# Patient Record
Sex: FEMALE | Race: BLACK OR AFRICAN AMERICAN | NOT HISPANIC OR LATINO | Employment: UNEMPLOYED | URBAN - METROPOLITAN AREA
[De-identification: names, ages, dates, MRNs, and addresses within clinical notes are randomized per-mention and may not be internally consistent; named-entity substitution may affect disease eponyms.]

---

## 2018-07-22 ENCOUNTER — HOSPITAL ENCOUNTER (EMERGENCY)
Facility: HOSPITAL | Age: 33
Discharge: HOME | End: 2018-07-22
Attending: EMERGENCY MEDICINE

## 2018-07-22 VITALS
OXYGEN SATURATION: 98 % | WEIGHT: 220 LBS | BODY MASS INDEX: 32.58 KG/M2 | HEART RATE: 69 BPM | TEMPERATURE: 98 F | DIASTOLIC BLOOD PRESSURE: 85 MMHG | SYSTOLIC BLOOD PRESSURE: 137 MMHG | RESPIRATION RATE: 16 BRPM | HEIGHT: 69 IN

## 2018-07-22 DIAGNOSIS — K04.7 DENTAL ABSCESS: Primary | ICD-10-CM

## 2018-07-22 PROCEDURE — 99282 EMERGENCY DEPT VISIT SF MDM: CPT

## 2018-07-22 RX ORDER — CLINDAMYCIN HYDROCHLORIDE 150 MG/1
300 CAPSULE ORAL EVERY 6 HOURS
Qty: 80 CAPSULE | Refills: 0 | Status: SHIPPED | OUTPATIENT
Start: 2018-07-22 | End: 2018-08-01

## 2018-07-22 RX ORDER — IBUPROFEN 600 MG/1
600 TABLET ORAL EVERY 6 HOURS PRN
Qty: 30 TABLET | Refills: 0 | Status: SHIPPED | OUTPATIENT
Start: 2018-07-22 | End: 2019-02-28

## 2018-07-22 NOTE — DISCHARGE INSTRUCTIONS
Dental Abscess   WHAT YOU NEED TO KNOW:   A dental abscess is a collection of pus in or around a tooth  A dental abscess is caused by bacteria  The bacteria usually enter the tooth when the enamel (outer part of the tooth) is damaged by tooth decay  Bacteria may also enter the tooth through a break or chip in the tooth, or a cut in the gum  Food particles that are stuck between the teeth for a long time may also lead to an abscess  DISCHARGE INSTRUCTIONS:   Return to the emergency department if:   · You have severe pain  · You have trouble breathing because of pain or swelling  Contact your healthcare provider if:   · Your symptoms get worse, even after treatment  · Your mouth is bleeding  · You cannot eat or drink because of pain or swelling  · Your abscess returns  · You have an injury that causes a crack in your tooth  · You have questions or concerns about your condition or care  Medicines: You may  need any of the following:  · Antibiotics  help treat a bacterial infection  · NSAIDs , such as ibuprofen, help decrease swelling, pain, and fever  This medicine is available with or without a doctor's order  NSAIDs can cause stomach bleeding or kidney problems in certain people  If you take blood thinner medicine, always ask your healthcare provider if NSAIDs are safe for you  Always read the medicine label and follow directions  · Acetaminophen  decreases pain and fever  It is available without a doctor's order  Ask how much to take and how often to take it  Follow directions  Read the labels of all other medicines you are using to see if they also contain acetaminophen, or ask your doctor or pharmacist  Acetaminophen can cause liver damage if not taken correctly  Do not use more than 4 grams (4,000 milligrams) total of acetaminophen in one day  · Prescription pain medicine  may be given  Ask your healthcare provider how to take this medicine safely   Some prescription pain medicines contain acetaminophen  Do not take other medicines that contain acetaminophen without talking to your healthcare provider  Too much acetaminophen may cause liver damage  Prescription pain medicine may cause constipation  Ask your healthcare provider how to prevent or treat constipation  · Take your medicine as directed  Contact your healthcare provider if you think your medicine is not helping or if you have side effects  Tell him of her if you are allergic to any medicine  Keep a list of the medicines, vitamins, and herbs you take  Include the amounts, and when and why you take them  Bring the list or the pill bottles to follow-up visits  Carry your medicine list with you in case of an emergency  Self-care:   · Rinse your mouth every 2 hours with salt water  This will help keep the area clean  · Gently brush your teeth twice a day with a soft tooth brush  This will help keep the area clean  · Eat soft foods as directed  Soft foods may cause less pain  Examples include applesauce, yogurt, and cooked pasta  Ask your healthcare provider how long to follow this instruction  · Apply a warm compress to your tooth or gum  Use a cotton ball or gauze soaked in warm water  Remove the compress in 10 minutes or when it becomes cool  Repeat 3 times a day  Prevent another abscess:   · Brush your teeth at least 2 times a day with fluoride toothpaste  · Use dental floss to clean between your teeth at least once a day  · Rinse your mouth with water or mouthwash after meals and snacks  · Chew sugarless gum after meals and snacks  · Limit foods that are sticky and high in sugar such as raisons  Also limit drinks high in sugar, such as soda  · See your dentist every 6 months for dental cleanings and oral exams  Follow up with your healthcare provider in 24 hours: Your healthcare provider will need to check your teeth and gums   Write down your questions so you remember to ask them during your visits  © 2017 [employer] [employer] Information is for End User's use only and may not be sold, redistributed or otherwise used for commercial purposes  All illustrations and images included in CareNotes® are the copyrighted property of A D A M , Inc  or Arnold Contreras  The above information is an  only  It is not intended as medical advice for individual conditions or treatments  Talk to your doctor, nurse or pharmacist before following any medical regimen to see if it is safe and effective for you

## 2018-08-28 ENCOUNTER — HOSPITAL ENCOUNTER (INPATIENT)
Facility: HOSPITAL | Age: 33
LOS: 2 days | Discharge: HOME | DRG: 218 | End: 2018-08-30
Attending: SURGERY | Admitting: SURGERY
Payer: OTHER MISCELLANEOUS

## 2018-08-28 ENCOUNTER — APPOINTMENT (INPATIENT)
Dept: RADIOLOGY | Facility: HOSPITAL | Age: 33
DRG: 218 | End: 2018-08-28
Payer: OTHER MISCELLANEOUS

## 2018-08-28 ENCOUNTER — ANESTHESIA EVENT (INPATIENT)
Dept: PERIOP | Facility: HOSPITAL | Age: 33
DRG: 218 | End: 2018-08-28
Payer: OTHER MISCELLANEOUS

## 2018-08-28 DIAGNOSIS — S42.401A CLOSED FRACTURE OF DISTAL END OF RIGHT HUMERUS, UNSPECIFIED FRACTURE MORPHOLOGY, INITIAL ENCOUNTER: Primary | ICD-10-CM

## 2018-08-28 DIAGNOSIS — S42.401D CLOSED FRACTURE OF DISTAL END OF RIGHT HUMERUS WITH ROUTINE HEALING, UNSPECIFIED FRACTURE MORPHOLOGY, SUBSEQUENT ENCOUNTER: ICD-10-CM

## 2018-08-28 LAB
ABO GROUP BLD: NORMAL
ANION GAP SERPL CALCULATED.3IONS-SCNC: 7 MMOL/L (ref 4–13)
APTT PPP: 24 SECONDS (ref 24–36)
ATRIAL RATE: 77 BPM
B-HCG SERPL-ACNC: <2 MIU/ML
BASE EXCESS BLDA CALC-SCNC: 2 MMOL/L (ref -2–3)
BASOPHILS # BLD AUTO: 0.11 THOUSANDS/ΜL (ref 0–0.1)
BASOPHILS NFR BLD AUTO: 1 % (ref 0–1)
BLD GP AB SCN SERPL QL: NEGATIVE
BUN SERPL-MCNC: 7 MG/DL (ref 5–25)
CA-I BLD-SCNC: 1.13 MMOL/L (ref 1.12–1.32)
CALCIUM SERPL-MCNC: 8.5 MG/DL (ref 8.3–10.1)
CHLORIDE SERPL-SCNC: 105 MMOL/L (ref 100–108)
CK SERPL-CCNC: 79 U/L (ref 26–192)
CO2 SERPL-SCNC: 26 MMOL/L (ref 21–32)
CREAT SERPL-MCNC: 0.63 MG/DL (ref 0.6–1.3)
EOSINOPHIL # BLD AUTO: 1.05 THOUSAND/ΜL (ref 0–0.61)
EOSINOPHIL NFR BLD AUTO: 6 % (ref 0–6)
ERYTHROCYTE [DISTWIDTH] IN BLOOD BY AUTOMATED COUNT: 12.9 % (ref 11.6–15.1)
GFR SERPL CREATININE-BSD FRML MDRD: 138 ML/MIN/1.73SQ M
GLUCOSE SERPL-MCNC: 106 MG/DL (ref 65–140)
GLUCOSE SERPL-MCNC: 126 MG/DL (ref 65–140)
HCO3 BLDA-SCNC: 25.9 MMOL/L (ref 24–30)
HCT VFR BLD AUTO: 37.7 % (ref 34.8–46.1)
HCT VFR BLD CALC: 38 % (ref 34.8–46.1)
HGB BLD-MCNC: 12.2 G/DL (ref 11.5–15.4)
HGB BLDA-MCNC: 12.9 G/DL (ref 11.5–15.4)
IMM GRANULOCYTES # BLD AUTO: 0.07 THOUSAND/UL (ref 0–0.2)
IMM GRANULOCYTES NFR BLD AUTO: 0 % (ref 0–2)
INR PPP: 0.99 (ref 0.86–1.17)
LYMPHOCYTES # BLD AUTO: 7.06 THOUSANDS/ΜL (ref 0.6–4.47)
LYMPHOCYTES NFR BLD AUTO: 41 % (ref 14–44)
MCH RBC QN AUTO: 30.4 PG (ref 26.8–34.3)
MCHC RBC AUTO-ENTMCNC: 32.4 G/DL (ref 31.4–37.4)
MCV RBC AUTO: 94 FL (ref 82–98)
MONOCYTES # BLD AUTO: 1.28 THOUSAND/ΜL (ref 0.17–1.22)
MONOCYTES NFR BLD AUTO: 7 % (ref 4–12)
NEUTROPHILS # BLD AUTO: 7.76 THOUSANDS/ΜL (ref 1.85–7.62)
NEUTS SEG NFR BLD AUTO: 45 % (ref 43–75)
NRBC BLD AUTO-RTO: 0 /100 WBCS
PCO2 BLD: 27 MMOL/L (ref 21–32)
PCO2 BLD: 37.5 MM HG (ref 42–50)
PH BLD: 7.45 [PH] (ref 7.3–7.4)
PLATELET # BLD AUTO: 302 THOUSANDS/UL (ref 149–390)
PMV BLD AUTO: 10.1 FL (ref 8.9–12.7)
PO2 BLD: 31 MM HG (ref 35–45)
POTASSIUM BLD-SCNC: 3.2 MMOL/L (ref 3.5–5.3)
POTASSIUM SERPL-SCNC: 3.8 MMOL/L (ref 3.5–5.3)
PROTHROMBIN TIME: 13.2 SECONDS (ref 11.8–14.2)
QRS AXIS: 56 DEGREES
QRSD INTERVAL: 86 MS
QT INTERVAL: 360 MS
QTC INTERVAL: 412 MS
RBC # BLD AUTO: 4.01 MILLION/UL (ref 3.81–5.12)
RH BLD: POSITIVE
SAO2 % BLD FROM PO2: 62 % (ref 95–98)
SODIUM BLD-SCNC: 140 MMOL/L (ref 136–145)
SODIUM SERPL-SCNC: 138 MMOL/L (ref 136–145)
SPECIMEN EXPIRATION DATE: NORMAL
SPECIMEN SOURCE: ABNORMAL
T WAVE AXIS: 3 DEGREES
VENTRICULAR RATE: 79 BPM
WBC # BLD AUTO: 17.33 THOUSAND/UL (ref 4.31–10.16)

## 2018-08-28 PROCEDURE — 70450 CT HEAD/BRAIN W/O DYE: CPT

## 2018-08-28 PROCEDURE — 82947 ASSAY GLUCOSE BLOOD QUANT: CPT

## 2018-08-28 PROCEDURE — 99285 EMERGENCY DEPT VISIT HI MDM: CPT

## 2018-08-28 PROCEDURE — 93010 ELECTROCARDIOGRAM REPORT: CPT | Performed by: INTERNAL MEDICINE

## 2018-08-28 PROCEDURE — 80048 BASIC METABOLIC PNL TOTAL CA: CPT | Performed by: ORTHOPAEDIC SURGERY

## 2018-08-28 PROCEDURE — 84702 CHORIONIC GONADOTROPIN TEST: CPT | Performed by: EMERGENCY MEDICINE

## 2018-08-28 PROCEDURE — 85730 THROMBOPLASTIN TIME PARTIAL: CPT | Performed by: ORTHOPAEDIC SURGERY

## 2018-08-28 PROCEDURE — 73020 X-RAY EXAM OF SHOULDER: CPT

## 2018-08-28 PROCEDURE — 86850 RBC ANTIBODY SCREEN: CPT | Performed by: ORTHOPAEDIC SURGERY

## 2018-08-28 PROCEDURE — 73070 X-RAY EXAM OF ELBOW: CPT

## 2018-08-28 PROCEDURE — 36415 COLL VENOUS BLD VENIPUNCTURE: CPT | Performed by: SURGERY

## 2018-08-28 PROCEDURE — 71045 X-RAY EXAM CHEST 1 VIEW: CPT

## 2018-08-28 PROCEDURE — 86900 BLOOD TYPING SEROLOGIC ABO: CPT | Performed by: ORTHOPAEDIC SURGERY

## 2018-08-28 PROCEDURE — 85025 COMPLETE CBC W/AUTO DIFF WBC: CPT | Performed by: SURGERY

## 2018-08-28 PROCEDURE — 74177 CT ABD & PELVIS W/CONTRAST: CPT

## 2018-08-28 PROCEDURE — 96374 THER/PROPH/DIAG INJ IV PUSH: CPT

## 2018-08-28 PROCEDURE — 71260 CT THORAX DX C+: CPT

## 2018-08-28 PROCEDURE — 36415 COLL VENOUS BLD VENIPUNCTURE: CPT | Performed by: ORTHOPAEDIC SURGERY

## 2018-08-28 PROCEDURE — 82803 BLOOD GASES ANY COMBINATION: CPT

## 2018-08-28 PROCEDURE — 85014 HEMATOCRIT: CPT

## 2018-08-28 PROCEDURE — 84295 ASSAY OF SERUM SODIUM: CPT

## 2018-08-28 PROCEDURE — 72125 CT NECK SPINE W/O DYE: CPT

## 2018-08-28 PROCEDURE — 85610 PROTHROMBIN TIME: CPT | Performed by: ORTHOPAEDIC SURGERY

## 2018-08-28 PROCEDURE — 86901 BLOOD TYPING SEROLOGIC RH(D): CPT | Performed by: ORTHOPAEDIC SURGERY

## 2018-08-28 PROCEDURE — 93005 ELECTROCARDIOGRAM TRACING: CPT

## 2018-08-28 PROCEDURE — 82550 ASSAY OF CK (CPK): CPT | Performed by: SURGERY

## 2018-08-28 PROCEDURE — 73120 X-RAY EXAM OF HAND: CPT

## 2018-08-28 PROCEDURE — 96375 TX/PRO/DX INJ NEW DRUG ADDON: CPT

## 2018-08-28 PROCEDURE — 82330 ASSAY OF CALCIUM: CPT

## 2018-08-28 PROCEDURE — 73060 X-RAY EXAM OF HUMERUS: CPT

## 2018-08-28 PROCEDURE — 84132 ASSAY OF SERUM POTASSIUM: CPT

## 2018-08-28 RX ORDER — OXYCODONE HYDROCHLORIDE 10 MG/1
10 TABLET ORAL EVERY 4 HOURS PRN
Status: DISCONTINUED | OUTPATIENT
Start: 2018-08-28 | End: 2018-08-31 | Stop reason: HOSPADM

## 2018-08-28 RX ORDER — FENTANYL CITRATE 50 UG/ML
INJECTION, SOLUTION INTRAMUSCULAR; INTRAVENOUS CODE/TRAUMA/SEDATION MEDICATION
Status: COMPLETED | OUTPATIENT
Start: 2018-08-28 | End: 2018-08-28

## 2018-08-28 RX ORDER — SODIUM CHLORIDE, SODIUM LACTATE, POTASSIUM CHLORIDE, CALCIUM CHLORIDE 600; 310; 30; 20 MG/100ML; MG/100ML; MG/100ML; MG/100ML
100 INJECTION, SOLUTION INTRAVENOUS CONTINUOUS
Status: DISCONTINUED | OUTPATIENT
Start: 2018-08-28 | End: 2018-08-29

## 2018-08-28 RX ORDER — METHOCARBAMOL 750 MG/1
750 TABLET, FILM COATED ORAL EVERY 6 HOURS PRN
Status: DISCONTINUED | OUTPATIENT
Start: 2018-08-28 | End: 2018-08-31 | Stop reason: HOSPADM

## 2018-08-28 RX ORDER — OXYCODONE HYDROCHLORIDE 5 MG/1
5 TABLET ORAL EVERY 4 HOURS PRN
Status: DISCONTINUED | OUTPATIENT
Start: 2018-08-28 | End: 2018-08-31 | Stop reason: HOSPADM

## 2018-08-28 RX ADMIN — METHOCARBAMOL TABLETS 750 MG: 750 TABLET, COATED ORAL at 14:47

## 2018-08-28 RX ADMIN — FENTANYL CITRATE 50 MCG: 50 INJECTION, SOLUTION INTRAMUSCULAR; INTRAVENOUS at 08:26

## 2018-08-28 RX ADMIN — OXYCODONE HYDROCHLORIDE 10 MG: 10 TABLET ORAL at 11:38

## 2018-08-28 RX ADMIN — HYDROMORPHONE HYDROCHLORIDE 0.5 MG: 1 INJECTION, SOLUTION INTRAMUSCULAR; INTRAVENOUS; SUBCUTANEOUS at 09:36

## 2018-08-28 RX ADMIN — METHOCARBAMOL TABLETS 750 MG: 750 TABLET, COATED ORAL at 20:53

## 2018-08-28 RX ADMIN — IOHEXOL 100 ML: 350 INJECTION, SOLUTION INTRAVENOUS at 09:06

## 2018-08-28 RX ADMIN — SODIUM CHLORIDE, SODIUM LACTATE, POTASSIUM CHLORIDE, AND CALCIUM CHLORIDE 100 ML/HR: .6; .31; .03; .02 INJECTION, SOLUTION INTRAVENOUS at 10:24

## 2018-08-28 RX ADMIN — OXYCODONE HYDROCHLORIDE 5 MG: 5 TABLET ORAL at 12:42

## 2018-08-28 RX ADMIN — FENTANYL CITRATE 50 MCG: 50 INJECTION, SOLUTION INTRAMUSCULAR; INTRAVENOUS at 08:37

## 2018-08-28 RX ADMIN — SODIUM CHLORIDE 1000 ML: 0.9 INJECTION, SOLUTION INTRAVENOUS at 08:38

## 2018-08-28 RX ADMIN — HYDROMORPHONE HYDROCHLORIDE 0.5 MG: 1 INJECTION, SOLUTION INTRAMUSCULAR; INTRAVENOUS; SUBCUTANEOUS at 10:23

## 2018-08-28 RX ADMIN — OXYCODONE HYDROCHLORIDE 10 MG: 10 TABLET ORAL at 17:07

## 2018-08-28 NOTE — TRAUMA DOCUMENTATION
Patient was struck and dragged a short distance with her arm pinned by a pallet davide coming off of a scale that was holding an unsecured drum of [employer] products  The pallet davide and drum alone weigh ~1 ton

## 2018-08-28 NOTE — ANESTHESIA PREPROCEDURE EVALUATION
Review of Systems/Medical History  Patient summary reviewed  Chart reviewed  No history of anesthetic complications     Cardiovascular  Negative cardio ROS    Pulmonary  Not a smoker , Asthma (Occasional/) ,        GI/Hepatic     Hiatal hernia,        Negative  ROS        Endo/Other  Negative endo/other ROS      GYN  Negative gynecology ROS          Hematology  Negative hematology ROS      Musculoskeletal    Comment: Crush injury-Right distal humerus fx      Neurology  Negative neurology ROS      Psychology   Negative psychology ROS              Physical Exam    Airway    Mallampati score: II  TM Distance: >3 FB  Neck ROM: full     Dental   No notable dental hx     Cardiovascular  Comment: Negative ROS, Rhythm: regular,     Pulmonary  Breath sounds clear to auscultation,     Other Findings        Anesthesia Plan  ASA Score- 2     Anesthesia Type- general with ASA Monitors  Additional Monitors:   Airway Plan: ETT  Plan Factors-    Induction- intravenous  Postoperative Plan- Plan for postoperative opioid use  Planned trial extubation    Informed Consent- Anesthetic plan and risks discussed with patient  I personally reviewed this patient with the CRNA  Discussed and agreed on the Anesthesia Plan with the CRNA  Sherre Soulier

## 2018-08-28 NOTE — ORTHOTIC NOTE
Orthotic Note            Date: 8/28/2018      Patient Name: Bre Borrego         Time: 14:00    Reason for Consult:  Patient Active Problem List   Diagnosis    Closed fracture of right distal humerus   Breg Wrist Thumb Lacer   Per Orthopedics    Received call from Orthopedics Dr Ruben Ortiz in regards to fitting patient for RUE Breg Wrist Thumb Lacer  Patient did not tolerate having brace donned and is currently at bedside  Patient complains of 10/10 pain in elbow and states please do not put that on now  I will continue to follow up to correctly fit and daren  Recommendations:  Please call Mobility Coordinator at ext  [phone] in regards to bracing instruction and/or adjustment  Giuliana Elliott Mobility Coordinator LCFo, LCOF, ASOP R  O T, O B T

## 2018-08-28 NOTE — ED NOTES
Ortho aware that patient is inquiring about her plan of care         Montserrat Fried RN  08/28/18 2353

## 2018-08-28 NOTE — ED NOTES
P9 called for a telebox x2  States will continue to search  ED charge YOANNA Banks RN  08/28/18 8702

## 2018-08-28 NOTE — ED PROVIDER NOTES
Emergency Department Airway Evaluation and Management Form    History  Obtained from: EMS  Patient has no allergy information on record  No chief complaint on file  HPI    No past medical history on file  No past surgical history on file  No family history on file  Social History   Substance Use Topics    Smoking status: Not on file    Smokeless tobacco: Not on file    Alcohol use Not on file     I have reviewed and agree with the history as documented  Review of Systems    Physical Exam  There were no vitals taken for this visit  Physical Exam    ED Medications  Medications   fentanyl citrate (PF) 100 MCG/2ML (50 mcg Intravenous Given 8/28/18 0826)       Intubation  Procedures    Notes  This 72-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

## 2018-08-28 NOTE — SOCIAL WORK
CM responded to trauma alert  Pt was brought to the ED via [facility] s/p work accident  Pt had materials fall on her and pin her down  Pt c/o +LOC  Pt requested CM contact her friend Derrell Ortega [phone]  Friend will come to the ED

## 2018-08-28 NOTE — ED NOTES
Dr Yoan Wade to discuss the plan of care with his team and will see the patient when they have a plan       Zamzam Longoria RN  08/28/18 4087

## 2018-08-28 NOTE — CONSULTS
Orthopedics   Babita Lindsay 34 y o  female MRN: [id number]  Unit/Bed#: X ray      Chief Complaint:   right elbow pain    HPI:   34 y o  right hand dominant female who works at [employer] status post crush injury complaining of right elbow pain  Patient notes getting her arm stuck under heavy creates in [place] can ease crates that slipped at work, getting her arm caught I feeling immediate pain  She has never hurt this elbow before, and she had no pain in this area before this  She she does not know any numbness or tingling but does note right thumb pain as well  Review Of Systems:   · Skin: Normal  · Neuro: See HPI  · Musculoskeletal: See HPI  · 14 point review of systems negative except as stated above     Past Medical History:   No past medical history on file  Past Surgical History:   No past surgical history on file  Family History:  Family history reviewed and non-contributory  No family history on file  Social History:  Social History     Social History    Marital status: /Civil Union     Spouse name: N/A    Number of children: N/A    Years of education: N/A     Social History Main Topics    Smoking status: Not on file    Smokeless tobacco: Not on file    Alcohol use Not on file    Drug use: Unknown    Sexual activity: Not on file     Other Topics Concern    Not on file     Social History Narrative    No narrative on file       Allergies:    Allergies not on file        Labs:    0  Lab Value Date/Time   HCT 37 7 08/28/2018 0843   HCT 38 08/28/2018 0832   HGB 12 2 08/28/2018 0843   HGB 12 9 08/28/2018 0832   WBC 17 33 (H) 08/28/2018 0843       Meds:    Current Facility-Administered Medications:      EMS REPLENISHMENT MED, , Does not apply, Once, Sunita Morris MD    fentanyl citrate (PF) 100 MCG/2ML, , Intravenous, Code/Trauma/Sedation Med, Luanne Gauthier MD, 50 mcg at 08/28/18 0837    HYDROmorphone (DILAUDID) injection 0 5 mg, 0 5 mg, Intravenous, Q4H PRN, Marquita Brighter, DO    lactated ringers infusion, 100 mL/hr, Intravenous, Continuous, Deberah Cool, DO    oxyCODONE (ROXICODONE) immediate release tablet 10 mg, 10 mg, Oral, Q4H PRN, Deberah Cool, DO    oxyCODONE (ROXICODONE) IR tablet 5 mg, 5 mg, Oral, Q4H PRN, Deberah Cool, DO    sodium chloride 0 9 % bolus, , , Code/Trauma/Sedation Med, Anh Gay MD, 1,000 mL at 08/28/18 6149  No current outpatient prescriptions on file  Blood Culture:   No results found for: BLOODCX    Wound Culture:   No results found for: WOUNDCULT    Ins and Outs:  No intake/output data recorded  Physical Exam:   /65   Pulse 77   Temp 97 7 °F (36 5 °C) (Tympanic)   Resp 22   SpO2 97%   Gen: Alert and oriented to person, place, time  HEENT: EOMI, eyes clear, moist mucus membranes, hearing intact  Respiratory: Bilateral chest rise  No audible wheezing found  Cardiovascular: Regular Rate and Rhythm  Abdomen: soft nontender/nondistended  Musculoskeletal: right upper extremity  · Skin intact  · Tender to palpation over elbow  · Sensation intact to radial, ulnar, median, nerve distributions  · Motor intact to ain/pin/m/r/u nerve distributions  · 2+ radial pulse    Radiology:   I personally reviewed the films  X-rays and CT right elbow shows distal humerus fracture    Assessment:  · 33 y  o female S/P crush injury at work with right distal humerus fracture    Plan:   · Non weight bearing right upper extremity in posterior slab splint  · Analgesics for pain  · NPO at midnight  · To OR for open reduction internal fixation of right distal humerus fracture  · Dispo: Ortho will follow  · Pre-op labs and full set of imaging in ED    Vin Philip MD details…

## 2018-08-29 ENCOUNTER — APPOINTMENT (INPATIENT)
Dept: RADIOLOGY | Facility: HOSPITAL | Age: 33
DRG: 218 | End: 2018-08-29
Payer: OTHER MISCELLANEOUS

## 2018-08-29 ENCOUNTER — ANESTHESIA (INPATIENT)
Dept: PERIOP | Facility: HOSPITAL | Age: 33
DRG: 218 | End: 2018-08-29
Payer: OTHER MISCELLANEOUS

## 2018-08-29 LAB
HCT VFR BLD AUTO: 39.6 % (ref 34.8–46.1)
HGB BLD-MCNC: 12.6 G/DL (ref 11.5–15.4)

## 2018-08-29 PROCEDURE — 0X980ZZ DRAINAGE OF RIGHT UPPER ARM, OPEN APPROACH: ICD-10-PCS | Performed by: ORTHOPAEDIC SURGERY

## 2018-08-29 PROCEDURE — 85014 HEMATOCRIT: CPT | Performed by: EMERGENCY MEDICINE

## 2018-08-29 PROCEDURE — C1713 ANCHOR/SCREW BN/BN,TIS/BN: HCPCS | Performed by: ORTHOPAEDIC SURGERY

## 2018-08-29 PROCEDURE — 85018 HEMOGLOBIN: CPT | Performed by: EMERGENCY MEDICINE

## 2018-08-29 PROCEDURE — 0PSF04Z REPOSITION RIGHT HUMERAL SHAFT WITH INTERNAL FIXATION DEVICE, OPEN APPROACH: ICD-10-PCS | Performed by: ORTHOPAEDIC SURGERY

## 2018-08-29 PROCEDURE — 99232 SBSQ HOSP IP/OBS MODERATE 35: CPT | Performed by: SURGERY

## 2018-08-29 PROCEDURE — 24515 OPTX HUMRL SHFT FX PLATE/SCR: CPT | Performed by: ORTHOPAEDIC SURGERY

## 2018-08-29 PROCEDURE — 73060 X-RAY EXAM OF HUMERUS: CPT

## 2018-08-29 DEVICE — 3.5MM CORTEX SCREW SELF-TAPPING 26MM: Type: IMPLANTABLE DEVICE | Status: FUNCTIONAL

## 2018-08-29 DEVICE — 3.5MM CORTEX SCREW SELF-TAPPING 30MM: Type: IMPLANTABLE DEVICE | Site: ARM | Status: FUNCTIONAL

## 2018-08-29 DEVICE — 3.5MM CORTEX SCREW SELF-TAPPING 22MM: Type: IMPLANTABLE DEVICE | Status: FUNCTIONAL

## 2018-08-29 DEVICE — 3.5MM LOCKING SCREW SLF-TPNG W/STARDRIVE(TM) RECESS 22MM: Type: IMPLANTABLE DEVICE | Site: ARM | Status: FUNCTIONAL

## 2018-08-29 DEVICE — 3.5MM CORTEX SCREW SELF-TAPPING 28MM: Type: IMPLANTABLE DEVICE | Status: FUNCTIONAL

## 2018-08-29 DEVICE — 3.5MM LOCKING SCREW SLF-TPNG W/STARDRIVE(TM) RECESS 26MM: Type: IMPLANTABLE DEVICE | Site: ARM | Status: FUNCTIONAL

## 2018-08-29 DEVICE — 3.5MM LOCKING SCREW SLF-TPNG W/STARDRIVE(TM) RECESS 20MM: Type: IMPLANTABLE DEVICE | Status: FUNCTIONAL

## 2018-08-29 DEVICE — 3.5MM CORTEX SCREW SELF-TAPPING 24MM: Type: IMPLANTABLE DEVICE | Status: FUNCTIONAL

## 2018-08-29 DEVICE — 3.5MM LOCKING SCREW SLF-TPNG W/STARDRIVE(TM) RECESS 18MM: Type: IMPLANTABLE DEVICE | Site: ARM | Status: FUNCTIONAL

## 2018-08-29 DEVICE — 3.5MM LOCKING SCREW SLF-TPNG W/STARDRIVE(TM) RECESS 24MM: Type: IMPLANTABLE DEVICE | Status: FUNCTIONAL

## 2018-08-29 DEVICE — 3.5MM LCP EXTRA-ARTICLR DISTAL HUMERUS PL 6H/RT 158MM LONG
Type: IMPLANTABLE DEVICE | Site: ARM | Status: FUNCTIONAL
Brand: LCP

## 2018-08-29 RX ORDER — OXYCODONE HYDROCHLORIDE 5 MG/1
5 TABLET ORAL EVERY 4 HOURS PRN
Qty: 30 TABLET | Refills: 0 | Status: SHIPPED | OUTPATIENT
Start: 2018-08-29

## 2018-08-29 RX ORDER — SODIUM CHLORIDE, SODIUM LACTATE, POTASSIUM CHLORIDE, CALCIUM CHLORIDE 600; 310; 30; 20 MG/100ML; MG/100ML; MG/100ML; MG/100ML
125 INJECTION, SOLUTION INTRAVENOUS CONTINUOUS
Status: DISCONTINUED | OUTPATIENT
Start: 2018-08-29 | End: 2018-08-29

## 2018-08-29 RX ORDER — FENTANYL CITRATE/PF 50 MCG/ML
50 SYRINGE (ML) INJECTION
Status: DISCONTINUED | OUTPATIENT
Start: 2018-08-29 | End: 2018-08-29 | Stop reason: HOSPADM

## 2018-08-29 RX ORDER — LIDOCAINE HYDROCHLORIDE 10 MG/ML
0.5 INJECTION, SOLUTION INFILTRATION; PERINEURAL ONCE AS NEEDED
Status: DISCONTINUED | OUTPATIENT
Start: 2018-08-29 | End: 2018-08-29 | Stop reason: HOSPADM

## 2018-08-29 RX ORDER — KETAMINE HYDROCHLORIDE 50 MG/ML
INJECTION, SOLUTION, CONCENTRATE INTRAMUSCULAR; INTRAVENOUS AS NEEDED
Status: DISCONTINUED | OUTPATIENT
Start: 2018-08-29 | End: 2018-08-29 | Stop reason: SURG

## 2018-08-29 RX ORDER — LIDOCAINE HYDROCHLORIDE 10 MG/ML
INJECTION, SOLUTION INFILTRATION; PERINEURAL AS NEEDED
Status: DISCONTINUED | OUTPATIENT
Start: 2018-08-29 | End: 2018-08-29 | Stop reason: SURG

## 2018-08-29 RX ORDER — MAGNESIUM HYDROXIDE 1200 MG/15ML
LIQUID ORAL AS NEEDED
Status: DISCONTINUED | OUTPATIENT
Start: 2018-08-29 | End: 2018-08-29 | Stop reason: HOSPADM

## 2018-08-29 RX ORDER — FENTANYL CITRATE 50 UG/ML
INJECTION, SOLUTION INTRAMUSCULAR; INTRAVENOUS AS NEEDED
Status: DISCONTINUED | OUTPATIENT
Start: 2018-08-29 | End: 2018-08-29 | Stop reason: SURG

## 2018-08-29 RX ORDER — MIDAZOLAM HYDROCHLORIDE 1 MG/ML
INJECTION INTRAMUSCULAR; INTRAVENOUS AS NEEDED
Status: DISCONTINUED | OUTPATIENT
Start: 2018-08-29 | End: 2018-08-29 | Stop reason: SURG

## 2018-08-29 RX ORDER — ONDANSETRON 2 MG/ML
INJECTION INTRAMUSCULAR; INTRAVENOUS AS NEEDED
Status: DISCONTINUED | OUTPATIENT
Start: 2018-08-29 | End: 2018-08-29 | Stop reason: SURG

## 2018-08-29 RX ORDER — ROCURONIUM BROMIDE 10 MG/ML
INJECTION, SOLUTION INTRAVENOUS AS NEEDED
Status: DISCONTINUED | OUTPATIENT
Start: 2018-08-29 | End: 2018-08-29 | Stop reason: SURG

## 2018-08-29 RX ORDER — GLYCOPYRROLATE 0.2 MG/ML
INJECTION INTRAMUSCULAR; INTRAVENOUS AS NEEDED
Status: DISCONTINUED | OUTPATIENT
Start: 2018-08-29 | End: 2018-08-29 | Stop reason: SURG

## 2018-08-29 RX ORDER — METOCLOPRAMIDE HYDROCHLORIDE 5 MG/ML
10 INJECTION INTRAMUSCULAR; INTRAVENOUS ONCE AS NEEDED
Status: DISCONTINUED | OUTPATIENT
Start: 2018-08-29 | End: 2018-08-29 | Stop reason: HOSPADM

## 2018-08-29 RX ORDER — PROPOFOL 10 MG/ML
INJECTION, EMULSION INTRAVENOUS AS NEEDED
Status: DISCONTINUED | OUTPATIENT
Start: 2018-08-29 | End: 2018-08-29 | Stop reason: SURG

## 2018-08-29 RX ORDER — SODIUM CHLORIDE, SODIUM LACTATE, POTASSIUM CHLORIDE, CALCIUM CHLORIDE 600; 310; 30; 20 MG/100ML; MG/100ML; MG/100ML; MG/100ML
50 INJECTION, SOLUTION INTRAVENOUS CONTINUOUS
Status: DISCONTINUED | OUTPATIENT
Start: 2018-08-29 | End: 2018-08-31 | Stop reason: HOSPADM

## 2018-08-29 RX ORDER — MEPERIDINE HYDROCHLORIDE 25 MG/ML
12.5 INJECTION INTRAMUSCULAR; INTRAVENOUS; SUBCUTANEOUS ONCE AS NEEDED
Status: DISCONTINUED | OUTPATIENT
Start: 2018-08-29 | End: 2018-08-29 | Stop reason: HOSPADM

## 2018-08-29 RX ORDER — ONDANSETRON 2 MG/ML
4 INJECTION INTRAMUSCULAR; INTRAVENOUS ONCE AS NEEDED
Status: COMPLETED | OUTPATIENT
Start: 2018-08-29 | End: 2018-08-29

## 2018-08-29 RX ADMIN — OXYCODONE HYDROCHLORIDE 10 MG: 10 TABLET ORAL at 06:23

## 2018-08-29 RX ADMIN — HYDROMORPHONE HYDROCHLORIDE 0.5 MG: 1 INJECTION, SOLUTION INTRAMUSCULAR; INTRAVENOUS; SUBCUTANEOUS at 22:52

## 2018-08-29 RX ADMIN — SODIUM CHLORIDE, SODIUM LACTATE, POTASSIUM CHLORIDE, AND CALCIUM CHLORIDE 50 ML/HR: .6; .31; .03; .02 INJECTION, SOLUTION INTRAVENOUS at 17:41

## 2018-08-29 RX ADMIN — OXYCODONE HYDROCHLORIDE 10 MG: 10 TABLET ORAL at 21:43

## 2018-08-29 RX ADMIN — PROPOFOL 200 MG: 10 INJECTION, EMULSION INTRAVENOUS at 12:13

## 2018-08-29 RX ADMIN — FENTANYL CITRATE 50 MCG: 50 INJECTION, SOLUTION INTRAMUSCULAR; INTRAVENOUS at 12:23

## 2018-08-29 RX ADMIN — SODIUM CHLORIDE, SODIUM LACTATE, POTASSIUM CHLORIDE, AND CALCIUM CHLORIDE: .6; .31; .03; .02 INJECTION, SOLUTION INTRAVENOUS at 12:01

## 2018-08-29 RX ADMIN — CEFAZOLIN SODIUM 2000 MG: 2 SOLUTION INTRAVENOUS at 12:29

## 2018-08-29 RX ADMIN — OXYCODONE HYDROCHLORIDE 10 MG: 10 TABLET ORAL at 17:39

## 2018-08-29 RX ADMIN — MIDAZOLAM 2 MG: 1 INJECTION INTRAMUSCULAR; INTRAVENOUS at 12:04

## 2018-08-29 RX ADMIN — DEXAMETHASONE SODIUM PHOSPHATE 8 MG: 10 INJECTION INTRAMUSCULAR; INTRAVENOUS at 12:23

## 2018-08-29 RX ADMIN — HYDROMORPHONE HYDROCHLORIDE 0.5 MG: 1 INJECTION, SOLUTION INTRAMUSCULAR; INTRAVENOUS; SUBCUTANEOUS at 14:23

## 2018-08-29 RX ADMIN — GLYCOPYRROLATE 0.6 MG: 0.2 INJECTION, SOLUTION INTRAMUSCULAR; INTRAVENOUS at 15:00

## 2018-08-29 RX ADMIN — METHOCARBAMOL TABLETS 750 MG: 750 TABLET, COATED ORAL at 23:52

## 2018-08-29 RX ADMIN — LIDOCAINE HYDROCHLORIDE 50 MG: 10 INJECTION, SOLUTION INFILTRATION; PERINEURAL at 12:13

## 2018-08-29 RX ADMIN — FENTANYL CITRATE 50 MCG: 50 INJECTION, SOLUTION INTRAMUSCULAR; INTRAVENOUS at 12:13

## 2018-08-29 RX ADMIN — HYDROMORPHONE HYDROCHLORIDE 0.5 MG: 1 INJECTION, SOLUTION INTRAMUSCULAR; INTRAVENOUS; SUBCUTANEOUS at 13:02

## 2018-08-29 RX ADMIN — SODIUM CHLORIDE, SODIUM LACTATE, POTASSIUM CHLORIDE, AND CALCIUM CHLORIDE 100 ML/HR: .6; .31; .03; .02 INJECTION, SOLUTION INTRAVENOUS at 16:41

## 2018-08-29 RX ADMIN — HYDROMORPHONE HYDROCHLORIDE 0.5 MG: 1 INJECTION, SOLUTION INTRAMUSCULAR; INTRAVENOUS; SUBCUTANEOUS at 15:17

## 2018-08-29 RX ADMIN — CEFAZOLIN SODIUM 1000 MG: 1 SOLUTION INTRAVENOUS at 21:42

## 2018-08-29 RX ADMIN — ROCURONIUM BROMIDE 40 MG: 10 INJECTION INTRAVENOUS at 12:13

## 2018-08-29 RX ADMIN — SODIUM CHLORIDE, SODIUM LACTATE, POTASSIUM CHLORIDE, AND CALCIUM CHLORIDE: .6; .31; .03; .02 INJECTION, SOLUTION INTRAVENOUS at 14:40

## 2018-08-29 RX ADMIN — ONDANSETRON 4 MG: 2 INJECTION INTRAMUSCULAR; INTRAVENOUS at 14:34

## 2018-08-29 RX ADMIN — KETAMINE HYDROCHLORIDE 35 MG: 50 INJECTION, SOLUTION INTRAMUSCULAR; INTRAVENOUS at 13:50

## 2018-08-29 RX ADMIN — HYDROMORPHONE HYDROCHLORIDE 0.4 MG: 1 INJECTION, SOLUTION INTRAMUSCULAR; INTRAVENOUS; SUBCUTANEOUS at 16:36

## 2018-08-29 RX ADMIN — METHOCARBAMOL TABLETS 750 MG: 750 TABLET, COATED ORAL at 06:23

## 2018-08-29 RX ADMIN — NEOSTIGMINE METHYLSULFATE 3 MG: 1 INJECTION, SOLUTION INTRAMUSCULAR; INTRAVENOUS; SUBCUTANEOUS at 15:00

## 2018-08-29 RX ADMIN — ONDANSETRON 4 MG: 2 INJECTION, SOLUTION INTRAMUSCULAR; INTRAVENOUS at 15:57

## 2018-08-29 RX ADMIN — HYDROMORPHONE HYDROCHLORIDE 0.5 MG: 1 INJECTION, SOLUTION INTRAMUSCULAR; INTRAVENOUS; SUBCUTANEOUS at 13:35

## 2018-08-29 RX ADMIN — FENTANYL CITRATE 50 MCG: 50 INJECTION INTRAMUSCULAR; INTRAVENOUS at 15:44

## 2018-08-29 RX ADMIN — FENTANYL CITRATE 50 MCG: 50 INJECTION INTRAMUSCULAR; INTRAVENOUS at 15:58

## 2018-08-29 RX ADMIN — METHOCARBAMOL TABLETS 750 MG: 750 TABLET, COATED ORAL at 17:39

## 2018-08-29 RX ADMIN — ROCURONIUM BROMIDE 10 MG: 10 INJECTION INTRAVENOUS at 13:34

## 2018-08-29 RX ADMIN — OXYCODONE HYDROCHLORIDE 10 MG: 10 TABLET ORAL at 00:32

## 2018-08-29 NOTE — OP NOTE
Op Note - Orthopedics   Babita Lindsay 34 y o  female MRN: [id number]  Unit/Bed#: Operating Room    PreOp Dx:  Right distal humeral shaft fracture     Postop Dx:  Right distal humeral shaft fracture     Procedure:  ORIF right distal humeral shaft fracture     Surgeon:  Jose Shelton     Assistants:  Ryan Banerjee     EBL: minimal     Drains: none     Specimens: none     Complications: none     Condition: Stable and transferred to postanesthesia care unit     Implant:   Synthes 6 hole 3 5 extra-articular distal humerus plate     Procedure Description:  Patient is a 46-year-old female who had a heavy object land on her 1 day ago and felt immediate pain in her right arm  She is brought to the emergency department and found to have a distal humeral shaft fracture  Operative versus nonoperative management was discussed with her and after complete understanding of risks and benefits of both options patient elected to have surgical management of this problem  On the day of the operation patient was brought to the OR and general anesthesia was administered  Preoperative antibiotics were administered  Patient was prepped and draped in usual sterile fashion  She was placed in a lateral decubitus position with the bean bag and axillary roll  There is no tourniquet applied  A time-out was completed and the consent was reviewed and all were in agreement  Biplanar fluoroscopy was used to confirm the distal humeral shaft fracture on the right upper extremity  The posterior triceps splitting approach was utilized  A 12 cm incision was made from the olecranon to the mid humeral shaft  Superficial fascia was visualized and incised sharply  Neurovascular structures were protected, hemostasis was obtained  The fascia was then visualized and sharply divided  The radial nerve was found and protected throughout the procedure  The fracture was identified and hematoma was evacuated    A small amount of comminution on the proximal fragment which was not recognized on preoperative x-rays was visualized intraoperatively  The fracture was reduced with a lobster claw reduction clamp  Biplanar fluoroscopy was used to confirm appropriate reduction  A Synthes 6-hole distal humerus extra-articular plate was placed and once again appropriate placement of plate fracture reduction was confirmed on biplanar fluoroscopy  The distal locking screws were then inserted through the plate  Two bicortical screws were then inserted in the proximal plate in bridging fashion  Biplanar fluoroscopy was used to confirm appropriate length of screws and appropriate fixation  Visual confirmation was utilized to ensure the radial nerve was not between the bone the plate  Fixation was achieved with proximal bicortical screws and distal locking screws and final x-rays were taken both AP and lateral   The patient's arm was extended to 0° and flex to 130° followed by 90deg pronation and 90 deg supination throughout her flexion/extension arc of motion  Copious irrigation was used followed by fascia closure with 0 Vicryl, subcutaneous closure with 2-0 Vicryl, and skin closure with staples  Patient was placed in Acticoat, 4x4s, ABDs, Webril for dressings, a posterior plaster splint was applied, followed by Webril and Ace  Patient was awakened in the operating room stable condition and brought to the PACU in stable condition  Dr Belle Walker was present for the entire procedure    Mayra Carbajal MD  3:13 PM

## 2018-08-29 NOTE — PHYSICAL THERAPY NOTE
Physical Therapy Screen  PT order received  Chart review performed  At this time, PT evaluation screened as patient in OR for OPEN REDUCTION W/ INTERNAL FIXATION (ORIF) DISTAL HUMERUS  Please reconsult post operatively       Meagan Monsivais, PT

## 2018-08-29 NOTE — PROGRESS NOTES
Subjective: 35 yo female with right humerus fracture  Doing well with continued pain in arm as well as right thumb pain  Splint placed yesterday but did not help and made pain worse  Objective:  Lab Results   Component Value Date/Time    WBC 17 33 (H) 08/28/2018 08:43 AM    HGB 12 2 08/28/2018 08:43 AM       Vitals:    08/29/18 0348   BP: 136/68   Pulse: 74   Resp: 20   Temp: 98 6 °F (37 °C)   SpO2: 99%     Right upper extremity  Dressing c/d/i with coaptation splint in place  Motor & sensation grossly intact   Radial nerve function motor and sensory exam intact  TTP over right thumb around MCP joint  2+ radial pulse    Assessment: 35 yo female with right humerus fracture and thumb contusion    Plan:  NWB RUE in coaptation splint and removable thumb spica as tolerated for comforted  To OR for ORIF R Humerus today  NPO  Will consider further imaging for right thumb if pain continues  Pain control  DVT ppx-Mechanical

## 2018-08-29 NOTE — ANESTHESIA POSTPROCEDURE EVALUATION
Post-Op Assessment Note      CV Status:  Stable    Mental Status:  Alert and awake    Hydration Status:  Euvolemic    PONV Controlled:  Controlled    Airway Patency:  Patent    Post Op Vitals Reviewed: Yes          Staff: CRNA           /100 (08/29/18 1524)    Temp 99 6 °F (37 6 °C) (08/29/18 1524)    Pulse 71 (08/29/18 1524)   Resp 15 (08/29/18 1524)    SpO2 99 % (08/29/18 1524)

## 2018-08-29 NOTE — SOCIAL WORK
CM met with pt to discuss the role of CM  Pt lives with her 15 y/o son in a 2nd floor apartment  Pt works, drives, and was fully independent PTA  Pt owns no DME or living will  Pt's pharmacy is [facility] [id number]  Pt has no hx of mental health or substance abuse  Pt states either her friends or brother will transport  Pt will be a workmans comp case   Pt provided the number and name of her  Priya Amaya [phone]; CM left voicemail for him as well as PATHS as pt has no insurance

## 2018-08-29 NOTE — CASE MANAGEMENT
Initial Clinical Review    Admission: Date/Time/Statement: 8/28/18 @ 0853     Orders Placed This Encounter   Procedures    Inpatient Admission     Standing Status:   Standing     Number of Occurrences:   1     Order Specific Question:   Admitting Physician     Answer:   Hudson Bourne [[id number]]     Order Specific Question:   Level of Care     Answer:   Med Surg [16]     Order Specific Question:   Bed Type     Answer:   Trauma [7]     Order Specific Question:   Estimated length of stay     Answer:   More than 2 Midnights     Order Specific Question:   Certification     Answer:   I certify that inpatient services are medically necessary for this patient for a duration of greater than two midnights  See H&P and MD Progress Notes for additional information about the patient's course of treatment  ED: Date/Time/Mode of Arrival:   ED Arrival Information     Expected Arrival Acuity Means of Arrival Escorted By Service Admission Type    - 8/28/2018 08:23 Immediate Ambulance [address]    Arrival Complaint    -          Chief Complaint:   Chief Complaint   Patient presents with    Trauma     palette fell on pt at work, pt pinned, deformity to right arm       History of Illness: 36y female who presents with accident at work  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  She was entrapped underneath the crate and her worker crew removed her from the position  She intially lost consciousness  Quickly regained consciousness and was GCS 15  She was complaining of right upper arm pain and pain over her back  She was on no home medications       ED Vital Signs:   ED Triage Vitals   Temperature Pulse Respirations Blood Pressure SpO2   08/28/18 0830 08/28/18 0830 08/28/18 0830 08/28/18 0830 08/28/18 0830   97 7 °F (36 5 °C) 77 22 136/65 97 %      Temp Source Heart Rate Source Patient Position - Orthostatic VS BP Location FiO2 (%)   08/28/18 0830 08/28/18 0830 08/28/18 0830 08/28/18 1900 --   Tympanic Monitor Lying Left arm       Pain Score       08/28/18 0824       Worst Possible Pain        Wt Readings from Last 1 Encounters:   No data found for Wt       Vital Signs (abnormal): HR = 125, 113, 96    Abnormal Labs: Wbc = 17 33    Diagnostic Test Results: Trauma/ Xray Right Shoulder -Transverse fracture distal right humeral metadiaphysis with lateral angulation distal fragment  CT Chest/ Abd/ Pelvis - Increased soft tissue density in the anterior mediastinum more than expected for residual thymic tissue  Findings are suspicious for small mediastinal hematoma in the setting of trauma and clinical correlation is recommended  ED Treatment:   Medication Administration from 08/28/2018 0821 to 08/28/2018 1640       Date/Time Order Dose Route Action     08/28/2018 0837 fentanyl citrate (PF) 100 MCG/2ML 50 mcg Intravenous Given     08/28/2018 0826 fentanyl citrate (PF) 100 MCG/2ML 50 mcg Intravenous Given     08/28/2018 0838 sodium chloride 0 9 % bolus 1,000 mL Intravenous Given     08/28/2018 1024 lactated ringers infusion 100 mL/hr Intravenous New Bag     08/28/2018 1242 oxyCODONE (ROXICODONE) IR tablet 5 mg 5 mg Oral Given     08/28/2018 1138 oxyCODONE (ROXICODONE) immediate release tablet 10 mg 10 mg Oral Given     08/28/2018 1023 HYDROmorphone (DILAUDID) injection 0 5 mg 0 5 mg Intravenous Given     08/28/2018 0936 HYDROmorphone (DILAUDID) injection 0 5 mg 0 5 mg Intravenous Given     08/28/2018 0906 iohexol (OMNIPAQUE) 350 MG/ML injection (MULTI-DOSE) 100 mL 100 mL Intravenous Given     08/28/2018 1447 methocarbamol (ROBAXIN) tablet 750 mg 750 mg Oral Given          Past Medical/Surgical History:    Active Ambulatory Problems     Diagnosis Date Noted    No Active Ambulatory Problems     Resolved Ambulatory Problems     Diagnosis Date Noted    No Resolved Ambulatory Problems     No Additional Past Medical History       Admitting Diagnosis: Shoulder injury [S49 90XA]  Closed fracture of distal end of right humerus, unspecified fracture morphology, initial encounter [S42 401A]    Age/Sex: 34 y o  female    Assessment/Plan:   Trauma Active Problems:   1  Right distal humeral fracture  2   Sternal hematoma      Trauma Plan:   CT Head/Cspine/ Chest/ Abdomen/ Pelvis  Admit as tele for sternal hematoma  CBC in am  Orthopedic surgery consult  Q2 hr neurovascular checks    Pain control      Admission Orders:  NPO; Sips with meds  8/29 OR Today - OPEN REDUCTION W/ INTERNAL FIXATION (ORIF) DISTAL HUMERUS (Right Arm Upper)    Tele monitoring  Orthopedic Surgery cons  PT/OT eval and treat      Scheduled Meds:     Continuous Infusions:   lactated ringers 100 mL/hr Last Rate: 100 mL/hr (08/29/18 0032)   lactated ringers 125 mL/hr      PRN Meds:   Robaxin po x2  Oxycodone po x4

## 2018-08-29 NOTE — PROGRESS NOTES
Progress Note - Tertiary Trauma Survery   Babita Lindsay 34 y o  female MRN: [id number]  Unit/Bed#: [facility] 929-01 Encounter: [id number]    Summary of Diagnosed Injuries:   - R distal humeral fracture  - R thumb pain  - sternal hematoma    Clinical Plan:   - OR today for ORIF of R humerus  - Pain control   - Telemetry monitoring for sternal hematoma   - H/H this am     Mechanism of Injury: Other: Pinned down by heavy crate at work    Transfer from: n/a  Outside Films Received: no  Tertiary Exam Due on: 8/29/18    Vitals: Blood pressure 136/68, pulse 74, temperature 98 6 °F (37 °C), temperature source Oral, resp  rate 20, SpO2 99 %  ,There is no height or weight on file to calculate BMI  CT / RADIOGRAPHS: ALL RESULTS MUST BE CONFIRMED BY FACULTY OR PRINTED REPORT    CT HEAD: No acute intracranial abnormality     CT CHEST:    CT FACE:  CT CHEST / ABDOMEN / PELVIS:  Increased soft tissue density in the anterior mediastinum more than expected for residual thymic tissue  Findings are suspicious for small mediastinal hematoma in the setting of trauma and clinical correlation is recommended  No sternal fracture      No evidence of acute traumatic injury in the abdomen or pelvis  CT CERVICAL SPINE: No cervical spine fracture or traumatic malalignment  XR PELVIS:    CT THORACIC / LUMBAR SPINE:  CXR CHEST:   Cardiomediastinal silhouette is within normal limits accounting for technique and patient positioning         Shallow depth of inspiration  Lungs are clear  No layering pleural effusions detected        No pneumothorax is seen on this supine film  Upright images are more sensitive to detect anterior pneumothoraces if relevant      No displaced fractures  XR R ELBOW:  Mildly displaced distal humeral shaft fracture again seen     XR R SHOULDER:  Faintly visualized fracture of the distal humerus, better seen on the earlier examinations  No evidence of shoulder dislocation     XR R HUMERUS:  Oblique distal right humeral shaft fracture status post external splinting     XR R HAND:  No acute fractures seen  (waiting official read)   OTHER: OTHER:    OTHER:  OTHER:    OTHER:  OTHER:      Consultants - List Service/ Faculty and Date:   - Orthopedic surgery       Active medications:           Current Facility-Administered Medications:     HYDROmorphone (DILAUDID) injection 0 5 mg, 0 5 mg, Intravenous, Q4H PRN, 0 5 mg at 08/28/18 1023    lactated ringers infusion, 100 mL/hr, Intravenous, Continuous, 100 mL/hr at 08/29/18 0032    methocarbamol (ROBAXIN) tablet 750 mg, 750 mg, Oral, Q6H PRN, 750 mg at 08/29/18 9918    oxyCODONE (ROXICODONE) immediate release tablet 10 mg, 10 mg, Oral, Q4H PRN, 10 mg at 08/29/18 8822    oxyCODONE (ROXICODONE) IR tablet 5 mg, 5 mg, Oral, Q4H PRN, 5 mg at 08/28/18 1242      Intake/Output Summary (Last 24 hours) at 08/29/18 6981  Last data filed at 08/29/18 4326   Gross per 24 hour   Intake             1190 ml   Output              425 ml   Net              765 ml       Invasive Devices     Peripheral Intravenous Line            Peripheral IV 08/28/18 Left Antecubital less than 1 day                CAGE-AID Questionnaire:    Was the patient able to participate in the CAGE-AID screening questions on admission? Yes    Is the patient 65 years or older: No    1  GCS:  GCS Total:  15  2  Head:   WNL  3  Neck:   WNL  4  Chest:   WNL  5  Abdomen/Pelvis:   WNL  6  Back (log roll with spinal immobilization unless cleared radiographically): WNL  7  Extremities:   Lacs, abrasions, swelling, ecchymosis: abrasion to posterior back and R gluteal area   Tenderness, pain with motor, instability:  R elbow tenderness, decreased ROM  8  Peripheral Nerves: WNL    Do NOT use the following abbreviations: DTO, gr, Rc, MS, MSO4, MgSO4, Nitro, QD, QID, QOD, u, , ?, ?g or trailing zeros   Always use a zero before a decimal     Labs:   CBC:   Lab Results   Component Value Date    WBC 17 33 (H) 08/28/2018    HGB 12 2 08/28/2018    HCT 37 7 08/28/2018    MCV 94 08/28/2018     08/28/2018    MCH 30 4 08/28/2018    MCHC 32 4 08/28/2018    RDW 12 9 08/28/2018    MPV 10 1 08/28/2018    NRBC 0 08/28/2018     CMP:   Lab Results   Component Value Date     08/28/2018     08/28/2018    CO2 26 08/28/2018    BUN 7 08/28/2018    CREATININE 0 63 08/28/2018    GLUCOSE 126 08/28/2018    CALCIUM 8 5 08/28/2018    EGFR 138 08/28/2018     Coagulation:   Lab Results   Component Value Date    INR 0 99 08/28/2018

## 2018-08-29 NOTE — PERIOPERATIVE NURSING NOTE
VSS, pt reports improvement in pain and nausea, assessment unchanged, report called, no questions, pt transferred to floor

## 2018-08-29 NOTE — PROGRESS NOTES
Report given to [facility] MARYCARMEN GORE RN,pt not ready for transfer to floor

## 2018-08-29 NOTE — DISCHARGE INSTRUCTIONS
Discharge Instructions - Orthopedics  Babita Lindsay 34 y o  female MRN: [id number]  Unit/Bed#: Operating Room    Weight Bearing Status:                                           nonweight bearing to the right upper extremity    DVT prophylaxis:  Per Trauma Recommendations    Pain:  Continue analgesics as directed    Dressing Instructions:   Keep dressing clean, dry and intact until follow up appointment  Do not shower until follow up    PT/OT:  Continue PT/OT on outpatient basis as directed    Appt Instructions: If you do not have your appointment, please call the clinic at [phone] to f/u with Dr Williamson Mt in 2 weeks    Contact the office sooner if you experience any increased numbness/tingling in the extremities

## 2018-08-29 NOTE — OCCUPATIONAL THERAPY NOTE
Occupational Therapy Screen     Orders received  Chart review completed  Per chart review, pt is pending the OR for R humerus fx  Will sign off  Please re-consult OT post-op with updated activity orders and WBS  Thank you!       Estefanía Patel MS, OTR/L

## 2018-08-30 VITALS
WEIGHT: 240 LBS | OXYGEN SATURATION: 100 % | HEART RATE: 72 BPM | HEIGHT: 69 IN | DIASTOLIC BLOOD PRESSURE: 64 MMHG | BODY MASS INDEX: 35.55 KG/M2 | TEMPERATURE: 98.2 F | RESPIRATION RATE: 16 BRPM | SYSTOLIC BLOOD PRESSURE: 130 MMHG

## 2018-08-30 PROCEDURE — G8979 MOBILITY GOAL STATUS: HCPCS

## 2018-08-30 PROCEDURE — G8978 MOBILITY CURRENT STATUS: HCPCS

## 2018-08-30 PROCEDURE — 97162 PT EVAL MOD COMPLEX 30 MIN: CPT

## 2018-08-30 PROCEDURE — G8980 MOBILITY D/C STATUS: HCPCS

## 2018-08-30 RX ORDER — METHOCARBAMOL 750 MG/1
750 TABLET, FILM COATED ORAL EVERY 6 HOURS PRN
Qty: 20 TABLET | Refills: 0 | Status: SHIPPED | OUTPATIENT
Start: 2018-08-30

## 2018-08-30 RX ADMIN — OXYCODONE HYDROCHLORIDE 10 MG: 10 TABLET ORAL at 06:06

## 2018-08-30 RX ADMIN — SODIUM CHLORIDE, SODIUM LACTATE, POTASSIUM CHLORIDE, AND CALCIUM CHLORIDE 50 ML/HR: .6; .31; .03; .02 INJECTION, SOLUTION INTRAVENOUS at 02:09

## 2018-08-30 RX ADMIN — OXYCODONE HYDROCHLORIDE 10 MG: 10 TABLET ORAL at 14:16

## 2018-08-30 RX ADMIN — HYDROMORPHONE HYDROCHLORIDE 0.5 MG: 1 INJECTION, SOLUTION INTRAMUSCULAR; INTRAVENOUS; SUBCUTANEOUS at 16:00

## 2018-08-30 RX ADMIN — HYDROMORPHONE HYDROCHLORIDE 0.5 MG: 1 INJECTION, SOLUTION INTRAMUSCULAR; INTRAVENOUS; SUBCUTANEOUS at 12:30

## 2018-08-30 RX ADMIN — CEFAZOLIN SODIUM 1000 MG: 1 SOLUTION INTRAVENOUS at 03:41

## 2018-08-30 RX ADMIN — HYDROMORPHONE HYDROCHLORIDE 0.5 MG: 1 INJECTION, SOLUTION INTRAMUSCULAR; INTRAVENOUS; SUBCUTANEOUS at 08:02

## 2018-08-30 RX ADMIN — METHOCARBAMOL TABLETS 750 MG: 750 TABLET, COATED ORAL at 18:16

## 2018-08-30 RX ADMIN — OXYCODONE HYDROCHLORIDE 10 MG: 10 TABLET ORAL at 10:14

## 2018-08-30 RX ADMIN — OXYCODONE HYDROCHLORIDE 10 MG: 10 TABLET ORAL at 02:09

## 2018-08-30 RX ADMIN — METHOCARBAMOL TABLETS 750 MG: 750 TABLET, COATED ORAL at 12:30

## 2018-08-30 RX ADMIN — OXYCODONE HYDROCHLORIDE 10 MG: 10 TABLET ORAL at 18:16

## 2018-08-30 RX ADMIN — HYDROMORPHONE HYDROCHLORIDE 0.5 MG: 1 INJECTION, SOLUTION INTRAMUSCULAR; INTRAVENOUS; SUBCUTANEOUS at 03:41

## 2018-08-30 NOTE — PHYSICAL THERAPY NOTE
Physical Therapy Evaluation     Patient's Name: Maegan Yun    Admitting Diagnosis  Shoulder injury [S49 90XA]  Closed fracture of distal end of right humerus, unspecified fracture morphology, initial encounter [S42 401A]    Problem List  Patient Active Problem List   Diagnosis    Closed fracture of right distal humerus       Past Medical History  History reviewed  No pertinent past medical history  Past Surgical History  Past Surgical History:   Procedure Laterality Date    APPENDECTOMY       SECTION      CHOLECYSTECTOMY      ORIF HUMERUS FRACTURE Right 2018    Procedure: OPEN REDUCTION W/ INTERNAL FIXATION (ORIF) DISTAL HUMERUS;  Surgeon: Hebert Lucas MD;  Location: [facility];  Service: Orthopedics    TONSILLECTOMY        18 1045   Note Type   Note type Eval only   Pain Assessment   Pain Assessment 0-10   Pain Score Worst Possible Pain   Pain Type Acute pain;Surgical pain   Pain Location Arm   Pain Orientation Right   Effect of Pain on Daily Activities IMPAIRED QUALITY OF MOBILITY    Patient's Stated Pain Goal No pain   Hospital Pain Intervention(s) Repositioned;Elevated   Response to Interventions elevated UE with pillows; also swapped sling for UE immobilizer    Home Living   Type of [facility] [address] Multi-level;Stairs to enter with rails   Bathroom Shower/Tub Tub/shower unit   Bathroom Toilet Standard   Bathroom Accessibility Accessible   Additional Comments Denies use of DME PTA    Prior Function   Level of [place] Independent with ADLs and functional mobility   Lives With Son   Receives Help From Family   ADL Assistance Independent   IADLs Independent   Falls in the last 6 months 0   Vocational Full time employment   Comments Pt driving and working  She lives at home with her 15year old son   Sister may be able to stay with family to assist with needs    Restrictions/Precautions   Weight Bearing Precautions Per Order Yes   RUE Weight Bearing Per Order NWB Other Precautions WBS;Pain  (allowed shoulder flexion/extension; NO INTERNAL OR EXTERNAL )   General   Additional Pertinent History 08/29/18 OPEN REDUCTION W/ INTERNAL FIXATION (ORIF) DISTAL HUMERUS    Family/Caregiver Present Yes   Cognition   Overall Cognitive Status WFL   Arousal/Participation Alert   Orientation Level Oriented X4   Memory Within functional limits   Following Commands Follows all commands and directions without difficulty   Comments Pt is pleasant and cooperative   Able to converse beyond basic needs    RUE Assessment   RUE Assessment (limited 2* pain and swelling)   LUE Assessment   LUE Assessment WFL   RLE Assessment   RLE Assessment WFL   LLE Assessment   LLE Assessment WFL   Coordination   Movements are Fluid and Coordinated 1   Sensation WFL   Light Touch   RLE Light Touch Grossly intact   LLE Light Touch Grossly intact   Proprioception   RLE Proprioception Grossly intact   LLE Proprioception Grossly Intact   Bed Mobility   Additional Comments Sitting EOB upon arrival with no assistance, adverse reactions or light headedness/dizziness   Transfers   Sit to Stand 7  Independent   Stand to Sit 7  Independent   Ambulation/Elevation   Gait pattern WNL   Gait Assistance 7  Independent   Assistive Device None   Distance 150'   Stair Management Assistance Not tested   Additional items Other (Comment)  (no anticiapted difficulties )   Balance   Static Sitting Normal   Dynamic Sitting Good   Static Standing Good   Dynamic Standing Good   Ambulatory Good   Endurance Deficit   Endurance Deficit No   Activity Tolerance   Activity Tolerance Patient tolerated treatment well   Medical Staff Made Aware Will, CM; Venkata Wilder, OT    Nurse Made Aware appropriate to see per RN    Assessment   Prognosis Good   Problem List Decreased skin integrity;Orthopedic restrictions;Pain;Decreased strength;Decreased range of motion   Assessment Pt is a 34year old female presenting s/p accident at work in which she sustained a closed fracture of distal end of R humerus  Pt underwent OPEN REDUCTION W/ INTERNAL FIXATION (ORIF) DISTAL HUMERUS performed 8/29/18  PT consulted to assess functional mobility and assist with safe d/c planning  PT eval performed POD #1 with NWB RUE in sling  PTA, pt living at home with her son where she was fully (I), denied use DME, worked and drove  On eval, pt limited by pain in RUE but was able to perform all activities (I) with no LOB or adverse reactions  Educated on precautions and encouraged having family come in to assist with (I)ADLs  No further skilled IP PT needs at this time  Recommend OPPT at d/c when cleared by ortho  Goals   Patient Goals Patient would like to have less pain    Plan   PT Frequency One time visit   Recommendation   Recommendation Home with family support; Outpatient PT   Equipment Recommended (none)   PT - OK to Discharge (when medically appropriate )   Barthel Index   Feeding 5   Bathing 0   Grooming Score 0   Dressing Score 5   Bladder Score 10   Bowels Score 10   Toilet Use Score 10   Transfers (Bed/Chair) Score 15   Mobility (Level Surface) Score 15   Stairs Score 0  (did not test-no anticiapted difficulties )   Barthel Index Score 70             Tanvi Morales, PT

## 2018-08-30 NOTE — PROGRESS NOTES
Progress Note - Babita Lindsay , 34 y o  female MRN: [id number]    Unit/Bed#: [facility] 929-01 Encounter: [id number]    Primary Care Provider: No primary care provider on file  Date and time admitted to hospital: 2018  8:23 AM    * Closed fracture of right distal humerus   Assessment & Plan    - POD#1 s/p ORIF of R humerus   - Pain control  - Ok for D/C per ortho w/ follow up in 2 weeks  - PT eval          Nurse-patient-provider rounds completed today with the patient's nurse  Disposition: Discharge after PT eval    Subjective: Patient complaining of R arm and shoulder pain s/p procedure  Says that it has remained constant  Admits to some numbness in R thumb  Objective:    Meds/Allergies   No prescriptions prior to admission  Current Facility-Administered Medications:     HYDROmorphone (DILAUDID) injection 0 5 mg, 0 5 mg, Intravenous, Q4H PRN, Antionette Starling, DO, 0 5 mg at 18 0802    lactated ringers infusion, 50 mL/hr, Intravenous, Continuous, Angeline Park MD, Last Rate: 50 mL/hr at 18 0209, 50 mL/hr at 18 0209    methocarbamol (ROBAXIN) tablet 750 mg, 750 mg, Oral, Q6H PRN, Florencio De Leon MD, 750 mg at 18 2352    oxyCODONE (ROXICODONE) immediate release tablet 10 mg, 10 mg, Oral, Q4H PRN, Antionette Starling, DO, 10 mg at 18 0606    oxyCODONE (ROXICODONE) IR tablet 5 mg, 5 mg, Oral, Q4H PRN, Antionette Starling, DO, 5 mg at 18 1242    Vitals: Blood pressure 142/71, pulse 68, temperature 97 8 °F (36 6 °C), temperature source Oral, resp  rate 16, height 5' 9" (1 753 m), weight 109 kg (240 lb), SpO2 100 %  Body mass index is 35 44 kg/m²   SpO2: SpO2: 100 %  Temp (24hrs), Av 5 °F (36 9 °C), Min:97 1 °F (36 2 °C), Max:99 6 °F (37 6 °C)  Current: Temperature: 97 8 °F (36 6 °C)    ABG: No results found for: PHART, NKO2YGM, PO2ART, VFM4KJH, N9FOXWCN, BEART, SOURCE      Intake/Output Summary (Last 24 hours) at 18 7755  Last data filed at 18 1080 Gross per 24 hour   Intake          2948  33 ml   Output             2425 ml   Net           523 33 ml       Invasive Devices     Peripheral Intravenous Line            Peripheral IV 08/29/18 Left Hand less than 1 day                          Nutrition/GI Proph/Bowel Reg: None  Physical Exam:     GENERAL APPEARANCE: Patient in no acute distress  HEENT: NCAT; PERRL, EOMs intact; Mucous membranes moist  NECK / BACK: No C/T/L spine tenderness  CV: Regular rate and rhythm; + S1, S2; no murmur/gallops/rubs appreciated  CHEST / LUNGS: Clear to auscultation; no wheezes/rales/rhonci  ABD: NABS; soft; non-distended; non-tender  EXT: +2 pulses bilaterally upper & lower extremities; no clubbing/cyanosis/edema  NEURO: GCS 15; no focal neurologic deficits; neurovascularly intact  SKIN: Warm, dry and well perfused; no rash; no jaundice  Lab Results: BMP/CMP: No results found for: NA, K, CL, CO2, ANIONGAP, BUN, CREATININE, GLUCOSE, CALCIUM, AST, ALT, ALKPHOS, PROT, ALBUMIN, BILITOT, EGFR, CBC: No results found for: WBC, HGB, HCT, MCV, PLT, ADJUSTEDWBC, MCH, MCHC, RDW, MPV, NRBC and Coagulation: No results found for: PT, INR, APTT  Imaging/EKG Studies: Results: I have personally reviewed pertinent reports      Other Studies:   VTE Prophylaxis: Ernst Bethea DO  8/30/2018

## 2018-08-30 NOTE — SOCIAL WORK
OP therapy script provided to pt   Pt provided CM with a telephone number [phone] to obtain [facility] info

## 2018-08-30 NOTE — DISCHARGE SUMMARY
Discharge Summary - Trauma Service   Babita Lindsay 34 y o  female MRN: [id number]  [facility] Encounter: [id number]    Admission Date: 8/28/2018     Discharge Date: 8/30/2018    Admitting Diagnosis: Shoulder injury [S49 90XA]  Closed fracture of distal end of right humerus, unspecified fracture morphology, initial encounter [S42 401A]    Discharge Diagnosis:   R distal humerus fracture    Attending and Service: , [facility] Trauma Service  Consulting Physician(s): Orthopedic Surgery    Imaging and Procedures Performed: No orders of the defined types were placed in this encounter  Hospital Course: Jean-Pierre Golden is a [id number] F who was brought to the hospital as a Level B trauma alert after being pinned down at work by a heavy crate  Patient was found to have a R distal humerus fracture which was splinted by orthopedic surgery in the ED and later taken to the OR for ORIF  She was also found to have a questionable small mediastinal hematoma on CT of the chest which was monitored with serial hemoglobins and remained stable  On discharge, the patient is instructed to follow-up with the patient's primary care provider in the next one month to review the events of the patient's recent hospitalization  You are instructed to follow up with  Orthopedic Surgery at their outpatient office within 2 wThe patient may resume a regular diet  The patient should follow the provided trauma service activity discharge instructions  The patient may shower daily, but should avoid tub baths or swimming for 2 weeks  The patient is instructed to call our office or return to the ER if develops a fever greater than 101, shaking chills, persistent nausea or vomiting, worsening or uncontrollable pain, and/or any increasing redness or purulent drainage from the patient's incision/wound   The patient may return to work/school when cleared by Orthopedic Surgery    Condition at Discharge: good     Discharge instructions/Information to patient and family:   See after visit summary for information provided to patient and family  Provisions for Follow-Up Care:  See after visit summary for information related to follow-up care and any pertinent home health orders  Disposition: Home    Planned Readmission: No    Discharge Statement   I spent 10 minutes discharging the patient  This time was spent on the day of discharge  I had direct contact with the patient on the day of discharge  Additional documentation is required if more than 30 minutes were spent on discharge  Discharge Medications:  See after visit summary for reconciled discharge medications provided to patient and family        Emma Lafleur DO  8/30/2018  11:44 AM

## 2018-08-30 NOTE — PROGRESS NOTES
Subjective: C/o pain in R arm which improves with elevation    Objective:  Lab Results   Component Value Date/Time    WBC 17 33 (H) 08/28/2018 08:43 AM    HGB 12 6 08/29/2018 08:23 AM       Vitals:    08/30/18 0336   BP: 150/87   Pulse: 95   Resp: 18   Temp: 98 4 °F (36 9 °C)   SpO2: 100%     RU extremity  splint c/d/i  Weak but intact Thumb retropulsion  Weak but intact Wrist extension  SILT R/U/M nerve distributions distally    Assessment: Post op from ORIF R distal humeral shaft fx    Plan:  NWB RUE in splint, sling for comfort  Elevation RUE  Pain control  DVT ppx  PT  Dispo: ok for discharge from ortho perspective

## 2018-08-30 NOTE — ASSESSMENT & PLAN NOTE
- POD#1 s/p ORIF of R humerus   - Pain control  - Ok for D/C per ortho w/ follow up in 2 weeks  - PT aneta

## 2018-08-30 NOTE — SOCIAL WORK
CM spoke to Angela Friedman [phone] who will contact pt with the [facility] info

## 2018-09-04 ENCOUNTER — TELEPHONE (OUTPATIENT)
Dept: OBGYN CLINIC | Facility: HOSPITAL | Age: 33
End: 2018-09-04

## 2018-09-11 ENCOUNTER — HOSPITAL ENCOUNTER (OUTPATIENT)
Dept: RADIOLOGY | Facility: HOSPITAL | Age: 33
Discharge: HOME | End: 2018-09-11
Attending: ORTHOPAEDIC SURGERY
Payer: OTHER MISCELLANEOUS

## 2018-09-11 ENCOUNTER — HOSPITAL ENCOUNTER (OUTPATIENT)
Dept: RADIOLOGY | Facility: HOSPITAL | Age: 33
Discharge: HOME | End: 2018-09-11
Attending: ORTHOPAEDIC SURGERY

## 2018-09-11 ENCOUNTER — OFFICE VISIT (OUTPATIENT)
Dept: OBGYN CLINIC | Facility: HOSPITAL | Age: 33
End: 2018-09-11

## 2018-09-11 VITALS
DIASTOLIC BLOOD PRESSURE: 81 MMHG | BODY MASS INDEX: 36.37 KG/M2 | HEART RATE: 80 BPM | SYSTOLIC BLOOD PRESSURE: 127 MMHG | HEIGHT: 68 IN | WEIGHT: 240 LBS

## 2018-09-11 DIAGNOSIS — M54.6 PAIN IN THORACIC SPINE: ICD-10-CM

## 2018-09-11 DIAGNOSIS — Z09 FRACTURE FOLLOW-UP: ICD-10-CM

## 2018-09-11 DIAGNOSIS — Z09 FRACTURE FOLLOW-UP: Primary | ICD-10-CM

## 2018-09-11 PROCEDURE — 73060 X-RAY EXAM OF HUMERUS: CPT

## 2018-09-11 PROCEDURE — 99024 POSTOP FOLLOW-UP VISIT: CPT | Performed by: ORTHOPAEDIC SURGERY

## 2018-09-11 PROCEDURE — 72110 X-RAY EXAM L-2 SPINE 4/>VWS: CPT

## 2018-09-18 ENCOUNTER — EVALUATION (OUTPATIENT)
Dept: PHYSICAL THERAPY | Facility: CLINIC | Age: 33
End: 2018-09-18
Payer: OTHER MISCELLANEOUS

## 2018-09-18 ENCOUNTER — HOSPITAL ENCOUNTER (OUTPATIENT)
Dept: CT IMAGING | Facility: HOSPITAL | Age: 33
Discharge: HOME | End: 2018-09-18
Attending: ORTHOPAEDIC SURGERY
Payer: OTHER MISCELLANEOUS

## 2018-09-18 DIAGNOSIS — M54.6 THORACIC SPINE PAIN: ICD-10-CM

## 2018-09-18 DIAGNOSIS — M54.6 PAIN IN THORACIC SPINE: ICD-10-CM

## 2018-09-18 DIAGNOSIS — S42.401D CLOSED FRACTURE OF DISTAL END OF RIGHT HUMERUS WITH ROUTINE HEALING, UNSPECIFIED FRACTURE MORPHOLOGY, SUBSEQUENT ENCOUNTER: Primary | ICD-10-CM

## 2018-09-18 PROCEDURE — G8990 OTHER PT/OT CURRENT STATUS: HCPCS | Performed by: PHYSICAL THERAPIST

## 2018-09-18 PROCEDURE — 72131 CT LUMBAR SPINE W/O DYE: CPT

## 2018-09-18 PROCEDURE — 97161 PT EVAL LOW COMPLEX 20 MIN: CPT | Performed by: PHYSICAL THERAPIST

## 2018-09-18 PROCEDURE — G8991 OTHER PT/OT GOAL STATUS: HCPCS | Performed by: PHYSICAL THERAPIST

## 2018-09-18 NOTE — PROGRESS NOTES
PT Evaluation     Today's date: 2018  Patient name: Solis Mata  : [id number]  MRN: [id number]  Referring provider: Abdullahi Hidalgo MD  Dx:   Encounter Diagnosis     ICD-10-CM    1  Closed fracture of distal end of right humerus with routine healing, unspecified fracture morphology, subsequent encounter S42 401D    2  Thoracic spine pain M54 6                   Assessment  Impairments: abnormal coordination, abnormal or restricted ROM, activity intolerance, impaired physical strength and pain with function    Assessment details: Pt is a pleasant 34 y o  female presenting to outpatient physical therapy with Closed fracture of distal end of right humerus with routine healing, unspecified fracture morphology, subsequent encounter  (primary encounter diagnosis), Thoracic spine pain   Pt presents with pain, decreased range of motion, decreased strength, and decreased tolerance to activity  Educated in precautions, limitations, s/sx's of infection, and given HEP  Pt is a good candidate for outpatient physical therapy and would benefit from skilled physical therapy to address limitations and to achieve goals  Thank you for this referral    Understanding of Dx/Px/POC: good   Prognosis: good    Goals  ST  Patient will report 25% decrease in pain in 4 weeks  2  Patient will demonstrate 25% improvement in ROM to bend elbow to dress in 4 weeks  3  Patient will demonstrate 25% improvement in  strength in 4 weeks  LT  Patient will be able to perform IADLS without restriction or pain by discharge  2  Patient will be independent in HEP by discharge  3  Patient will be able to return to recreational/work duties without restriction or pain by discharge        Plan  Patient would benefit from: PT eval and skilled PT  Planned modality interventions: cryotherapy and TENS  Planned therapy interventions: IADL retraining, body mechanics training, flexibility, functional ROM exercises, home exercise program, neuromuscular re-education, manual therapy, postural training, strengthening, stretching, therapeutic activities, therapeutic exercise, joint mobilization, patient education, self care and orthotic fitting/training  Frequency: 3x week  Duration in visits: 12  Duration in weeks: 4  Treatment plan discussed with: patient        Subjective Evaluation    History of Present Illness  Date of surgery: 8/29/2018  Mechanism of injury: Pt comes to therapy reporting h/o right distal humerus ORIF s/p fall at work  Notes she had follow up with orthopedic physician last week, where she was prescribed a hinged brace, had staple removed, and was cleared to begin physical therapy  Reports she has been compliant with wearing UE hinged-brace throughout the day and night, unless showering as prescribed  Reports numbness in right thumb and index finger, however, denies paresthesias elsewhere  Pt reports she experiences burning in right elbow  Also reports right UT and thoracolumbar spine pain, which is exacerbated by sitting  Describes UT and back pain as tightness, tender to palpation  Pt denies bowel or bladder dysfunction, saddle anesthesia, fever, chills, nausea, or vomiting  Denies calf pain, redness, or swelling  Pain  No pain reported  Pain scale: elbow 6/10, back 5/10  Pain scale at lowest: elbow 3/10, back 3/10  Pain scale at highest: elbow 8/10, back 5/10  Relieving factors: ice, support and rest    Social Support  Lives with: young children    Hand dominance: right    Patient Goals  Patient goals for therapy: decreased pain, increased strength, independence with ADLs/IADLs, return to sport/leisure activities, return to work, increased motion and decreased edema          Objective     Cervical/Thoracic Screen   Cervical range of motion within normal limits  Cervical range of motion within normal limits with the following exceptions:  At IE: denies pain with ROM testing, except for right-sided tightness noted with left rotation    Active Range of Motion     Right Shoulder   Flexion: 89 degrees   Extension: 25 degrees   Abduction: 42 degrees     Right Wrist   Wrist flexion: 30 degrees   Wrist extension: 34 degrees   Radial deviation: 9 degrees   Ulnar deviation: 11 degrees     Lumbar   Flexion: 110 degrees   Extension: 20 degrees   Left lateral flexion: 28 degrees   Right lateral flexion: 39 degrees   Left rotation: WFL  Right rotation: [facility]    Additional Active Range of Motion Details  No measured at IE due to precautions from physician    Passive Range of Motion     Right Elbow   Flexion: 80 degrees with pain  Extension: -54 degrees with pain  Forearm supination: 45 degrees with pain  Forearm pronation: 72 degrees with pain    Right Wrist   Wrist flexion: 42 degrees   Wrist extension: 40 degrees   Radial deviation: 16 degrees   Ulnar deviation: 24 degrees     Strength/Myotome Testing     Left Wrist/Hand      (2nd hand position)     Trial 1: 92    Trial 2: 90    Trial 3: 94    Thumb Strength  Key/Lateral Pinch     Trail 1: 10    Trail 2: 13    Trial 3: 14    Average: 12 33    Right Wrist/Hand      (2nd hand position)     Trial 1: 4    Trial 2: 3    Trial 3: 3    Thumb Strength   Key/Lateral Pinch     Trial 1: 0    Trial 2: 0    Trial 3: 0    Average: 0    Additional Strength Details  No measured at IE due to precautions from physician          Precautions: as of 9/18 - AAROM right elbow, brace locked 0-90 deg    Daily Treatment Diary     Manual              PROM R elbow to nataly[place]             PROM R wrist             PROM R shoulder flex & abd             T/S PA mobs             L/S roll mobs               HEP: 9/18 Elbow flex/ext AAROM, wrist AROM, , shoulder flex AROM, scap squeeze    Exercise Diary              Wand elbow flex and ext AAROM             Wand shoulder flex, abd, ext             Digiflex  strength             Right UT stretch                          LTR             Book openings             Seated pball flex             Thoracic ext in chair                                                                                                                                                                Modalities

## 2018-09-20 ENCOUNTER — APPOINTMENT (OUTPATIENT)
Dept: PHYSICAL THERAPY | Facility: CLINIC | Age: 33
End: 2018-09-20
Payer: OTHER MISCELLANEOUS

## 2018-09-21 ENCOUNTER — OFFICE VISIT (OUTPATIENT)
Dept: PHYSICAL THERAPY | Facility: CLINIC | Age: 33
End: 2018-09-21
Payer: OTHER MISCELLANEOUS

## 2018-09-21 DIAGNOSIS — M54.6 THORACIC SPINE PAIN: ICD-10-CM

## 2018-09-21 DIAGNOSIS — S42.401D CLOSED FRACTURE OF DISTAL END OF RIGHT HUMERUS WITH ROUTINE HEALING, UNSPECIFIED FRACTURE MORPHOLOGY, SUBSEQUENT ENCOUNTER: Primary | ICD-10-CM

## 2018-09-21 PROCEDURE — 97140 MANUAL THERAPY 1/> REGIONS: CPT | Performed by: PHYSICAL THERAPIST

## 2018-09-21 PROCEDURE — 97110 THERAPEUTIC EXERCISES: CPT | Performed by: PHYSICAL THERAPIST

## 2018-09-21 PROCEDURE — 97530 THERAPEUTIC ACTIVITIES: CPT | Performed by: PHYSICAL THERAPIST

## 2018-09-21 NOTE — PROGRESS NOTES
Daily Note     Today's date: 2018  Patient name: Kristopher Schirmer  :   MRN: [id number]  Referring provider: Cathi Urban MD  Dx:   Encounter Diagnosis     ICD-10-CM    1  Closed fracture of distal end of right humerus with routine healing, unspecified fracture morphology, subsequent encounter S42 401D    2  Thoracic spine pain M54 6                   Subjective: Pt comes to therapy reporting she has been compliant with performing home exercises with good tolerance  Reports appropriate stretching with exercises  Objective: See treatment diary below        Precautions: as of  - AAROM right elbow, brace locked 0-90 deg    Daily Treatment Diary     Manual              PROM R elbow to nat[place] perf JK            PROM R wrist perf JK            PROM R shoulder flex & abd perf JK            T/S PA mobs nv            L/S roll mobs perf JK              HEP:  Elbow flex/ext AAROM, wrist AROM, , shoulder flex AROM, scap squeeze    Exercise Diary              Wand elbow flex and ext AAROM 5"x10 ea            Wand shoulder flex, abd, ext 5"x10 ea            Digiflex  strength No digiflex 20x            Right UT stretch 30"x3                          LTR nv            Book openings nv            Seated pball flex 15"x5            Thoracic ext in chair nv                                                                                                                                                               Modalities                                                             Assessment: Tolerated treatment well  Patient demonstrated fatigue post treatment, exhibited good technique with therapeutic exercises and would benefit from continued PT  Reported appropriate responses to manuals and AAROM exercises  Incision clean, dry, negative for drainage or s/sx's of infection, with steri-strips intact  Educated patient home self-PROM and AAROM         Plan: Progress treatment as tolerated

## 2018-09-24 ENCOUNTER — OFFICE VISIT (OUTPATIENT)
Dept: PHYSICAL THERAPY | Facility: CLINIC | Age: 33
End: 2018-09-24
Payer: OTHER MISCELLANEOUS

## 2018-09-24 DIAGNOSIS — M54.6 THORACIC SPINE PAIN: ICD-10-CM

## 2018-09-24 DIAGNOSIS — S42.401D CLOSED FRACTURE OF DISTAL END OF RIGHT HUMERUS WITH ROUTINE HEALING, UNSPECIFIED FRACTURE MORPHOLOGY, SUBSEQUENT ENCOUNTER: Primary | ICD-10-CM

## 2018-09-24 PROCEDURE — 97110 THERAPEUTIC EXERCISES: CPT

## 2018-09-24 PROCEDURE — 97140 MANUAL THERAPY 1/> REGIONS: CPT

## 2018-09-24 NOTE — PROGRESS NOTES
Daily Note     Today's date: 2018  Patient name: Neo Martinez  : [id number]  MRN: [id number]  Referring provider: Silvana Banerjee MD  Dx:   Encounter Diagnosis     ICD-10-CM    1  Closed fracture of distal end of right humerus with routine healing, unspecified fracture morphology, subsequent encounter S42 401D    2  Thoracic spine pain M54 6                   Subjective: Pt reports with c/o significant pain in mid thoracic region graded 8/10 and 7-8/10 in R elbow  Objective: See treatment diary below        Precautions: as of  - AAROM right elbow, brace locked 0-90 deg    Daily Treatment Diary     Manual             PROM R elbow to nat[place] perf JK perf  TE           PROM R wrist perf JK perf  TE           PROM R shoulder flex & abd perf JK perf  TE           T/S PA mobs nv            L/S roll mobs perf JK deferred             HEP:  Elbow flex/ext AAROM, wrist AROM, , shoulder flex AROM, scap squeeze    Exercise Diary             Wand elbow flex and ext AAROM 5"x10 ea 5"x10           Wand shoulder flex, abd, ext 5"x10 ea 5"x10           Digiflex  strength No digiflex 20x No  digi flex x20           Right UT stretch 30"x3  30"x3                        LTR nv 10"x5           Book openings nv nv           Seated pball flex 15"x5 10"x5           Thoracic ext in chair nv nv                                                                                                                                                              Modalities                                                             Assessment: Tolerated treatment well  Added LTR's with appropriate stretch response  Patient demonstrated fatigue post treatment, exhibited good technique with therapeutic exercises and would benefit from continued PT  Reported appropriate responses to manuals and AAROM exercises  Plan: Progress treatment as tolerated

## 2018-09-26 ENCOUNTER — OFFICE VISIT (OUTPATIENT)
Dept: PHYSICAL THERAPY | Facility: CLINIC | Age: 33
End: 2018-09-26
Payer: OTHER MISCELLANEOUS

## 2018-09-26 DIAGNOSIS — S42.401D CLOSED FRACTURE OF DISTAL END OF RIGHT HUMERUS WITH ROUTINE HEALING, UNSPECIFIED FRACTURE MORPHOLOGY, SUBSEQUENT ENCOUNTER: Primary | ICD-10-CM

## 2018-09-26 DIAGNOSIS — M54.6 THORACIC SPINE PAIN: ICD-10-CM

## 2018-09-26 PROCEDURE — 97110 THERAPEUTIC EXERCISES: CPT

## 2018-09-26 PROCEDURE — 97140 MANUAL THERAPY 1/> REGIONS: CPT

## 2018-09-26 NOTE — PROGRESS NOTES
Daily Note     Today's date: 2018  Patient name: Kathye Meigs  :   MRN: [id number]  Referring provider: Segundo Grimes MD  Dx:   Encounter Diagnosis     ICD-10-CM    1  Closed fracture of distal end of right humerus with routine healing, unspecified fracture morphology, subsequent encounter S42 401D    2  Thoracic spine pain M54 6                   Subjective: Pt states pain is 8/10 in R shoulder and 9/10 in R elbow  Pain in back starts near 12th rib up to upper traps; described pain as a sharp stabbing pain  Numbness in her right thumb and pointer finger, pain c light touch  Pt states pain in midthoracic area  Objective: See treatment diary below        Precautions: as of  - AAROM right elbow, brace locked 0-90 deg    Daily Treatment Diary     Manual            PROM R elbow to nat[place] perf JK perf  TE perf TE          PROM R wrist perf JK perf  TE perf TE          PROM R shoulder flex & abd perf JK perf  TE perf TE          T/S PA mobs nv   JK          L/S roll mobs perf JK deferred             HEP:  Elbow flex/ext AAROM, wrist AROM, , shoulder flex AROM, scap squeeze    Exercise Diary            bike   5'          Wand elbow flex and ext AAROM 5"x10 ea 5"x10 5''x10          Wand shoulder flex, abd, ext 5"x10 ea 5"x10 5''x10          Digiflex  strength No digiflex 20x No  digi flex x20 No  digi flex x20          Right UT stretch 30"x3  30"x3 3x30'' ea                       LTR nv 10"x5 10''x5          Book openings nv nv np          Seated pball flex 15"x5 10"x5 10''x5          Thoracic ext in chair nv nv np                                                                                                                                                             Modalities                                                             Assessment: Pt tolerated treatment well   Pain in right bicep and olecranon process region during extension while performing AAROM wand ex/flex  Most discomfort and pain c AAROM shoulder extension  Pt reports relief seated pball flex stretching her shoulders and back  Pt rates pain 8/10 in both right shoulder and elbow post tx and notes soreness  Plan: Progress treatment as tolerated

## 2018-09-28 ENCOUNTER — APPOINTMENT (OUTPATIENT)
Dept: PHYSICAL THERAPY | Facility: CLINIC | Age: 33
End: 2018-09-28
Payer: OTHER MISCELLANEOUS

## 2018-10-01 ENCOUNTER — OFFICE VISIT (OUTPATIENT)
Dept: PHYSICAL THERAPY | Facility: CLINIC | Age: 33
End: 2018-10-01
Payer: OTHER MISCELLANEOUS

## 2018-10-01 DIAGNOSIS — S42.401D CLOSED FRACTURE OF DISTAL END OF RIGHT HUMERUS WITH ROUTINE HEALING, UNSPECIFIED FRACTURE MORPHOLOGY, SUBSEQUENT ENCOUNTER: Primary | ICD-10-CM

## 2018-10-01 DIAGNOSIS — M54.6 THORACIC SPINE PAIN: ICD-10-CM

## 2018-10-01 PROCEDURE — 97150 GROUP THERAPEUTIC PROCEDURES: CPT

## 2018-10-01 PROCEDURE — 97140 MANUAL THERAPY 1/> REGIONS: CPT

## 2018-10-01 PROCEDURE — 97110 THERAPEUTIC EXERCISES: CPT

## 2018-10-01 NOTE — PROGRESS NOTES
Daily Note     Today's date: 10/1/2018  Patient name: Dayne Villagomez  :   MRN: [id number]  Referring provider: Haylee Valiente MD  Dx:   Encounter Diagnosis     ICD-10-CM    1  Closed fracture of distal end of right humerus with routine healing, unspecified fracture morphology, subsequent encounter S42 401D    2  Thoracic spine pain M54 6                   Subjective: Pt   Objective: See treatment diary below        Precautions: as of  - AAROM right elbow, brace locked 0-90 deg    Daily Treatment Diary     Manual            PROM R elbow to nat[place] perf JK perf  TE perf TE          PROM R wrist perf JK perf  TE perf TE          PROM R shoulder flex & abd perf JK perf  TE perf TE          T/S PA mobs nv   JK          L/S roll mobs perf JK deferred             HEP:  Elbow flex/ext AAROM, wrist AROM, , shoulder flex AROM, scap squeeze    Exercise Diary   10/1         bike   5' 5'         Wand elbow flex and ext AAROM 5"x10 ea 5"x10 5''x10 5"x10         Wand shoulder flex, abd, ext 5"x10 ea 5"x10 5''x10 nv         Digiflex  strength No digiflex 20x No  digi flex x20 No  digi flex x20 Nodigiflex         Right UT stretch 30"x3  30"x3 3x30'' ea 30"x3                      LTR nv 10"x5 10''x5 15"x5         Book openings nv nv np          Seated pball flex 15"x5 10"x5 10''x5 15"x5 p! In shldr         Thoracic ext in chair nv nv np                                                                                                                                                             Modalities                                                             Assessment: Pt tolerated treatment well  Pain in right bicep and olecranon process region during extension while performing AAROM wand ex/flex  Most discomfort and pain c AAROM shoulder extension  Pt reports relief seated pball flex stretching her shoulders and back   Pt rates pain 8/10 in both right shoulder and elbow post tx and notes soreness  Plan: Progress treatment as tolerated

## 2018-10-01 NOTE — PROGRESS NOTES
Daily Note     Today's date: 10/1/2018  Patient name: Karen Graham  :   MRN: [id number]  Referring provider: Timmy Julien MD  Dx:   Encounter Diagnosis     ICD-10-CM    1  Closed fracture of distal end of right humerus with routine healing, unspecified fracture morphology, subsequent encounter S42 401D    2  Thoracic spine pain M54 6                   Subjective: Pt reports with c/o significant discomfort in R shoulder described as pressure and c/o numbness in hand while sleeping at night time  She noted occurring for three nights following last visit, but has diminished at present time  She noted continued pain in shoulder and elbow, but has not taken pain meds  She noted scheduling 4 wk f/u with surgeon on Oct 9th        Objective: See treatment diary below        Precautions: as of  - AAROM right elbow, brace locked 0-90 deg    Daily Treatment Diary     Manual  9/21 9/24 9/26 10/1         PROM R elbow to nat[place] perf JK perf  TE perf TE perf  TE         PROM R wrist perf JK perf  TE perf TE np         PROM R shoulder flex & abd perf JK perf  TE perf TE perf  TE         T/S PA mobs nv   JK np           L/S roll mobs perf JK deferred  np           HEP:  Elbow flex/ext AAROM, wrist AROM, , shoulder flex AROM, scap squeeze    Exercise Diary  9/21 9/24 9/26 10/1         bike   5' 5'         Wand elbow flex and ext AAROM 5"x10 ea 5"x10 5''x10 5"x10         Wand shoulder flex, abd, ext 5"x10 ea 5"x10 5''x10 nv         Digiflex  strength No digiflex 20x No  digi flex x20 No  digi flex x20 No digi  x20         Right UT stretch 30"x3  30"x3 3x30'' ea 30"x3 ea                      LTR nv 10"x5 10''x5 10"x5         Book openings nv nv np          Seated pball flex 15"x5 10"x5 10''x5 10"x5         Thoracic ext in chair nv nv np                                                                                                                                                             Modalities Assessment: Pt tolerated treatment fairly well  Held shoulder wand exercises due to c/o shoulder pain  Responds well to shldr and elbow PROM  Plan: Progress treatment as tolerated

## 2018-10-03 ENCOUNTER — OFFICE VISIT (OUTPATIENT)
Dept: PHYSICAL THERAPY | Facility: CLINIC | Age: 33
End: 2018-10-03
Payer: OTHER MISCELLANEOUS

## 2018-10-03 DIAGNOSIS — S42.401D CLOSED FRACTURE OF DISTAL END OF RIGHT HUMERUS WITH ROUTINE HEALING, UNSPECIFIED FRACTURE MORPHOLOGY, SUBSEQUENT ENCOUNTER: Primary | ICD-10-CM

## 2018-10-03 DIAGNOSIS — M54.6 THORACIC SPINE PAIN: ICD-10-CM

## 2018-10-03 PROCEDURE — 97140 MANUAL THERAPY 1/> REGIONS: CPT | Performed by: PHYSICAL THERAPIST

## 2018-10-03 PROCEDURE — 97110 THERAPEUTIC EXERCISES: CPT | Performed by: PHYSICAL THERAPIST

## 2018-10-03 NOTE — PROGRESS NOTES
Daily Note     Today's date: 10/3/2018  Patient name: Zahida Patino  : [id number]  MRN: [id number]  Referring provider: Israel Jordan MD  Dx:   Encounter Diagnosis     ICD-10-CM    1  Closed fracture of distal end of right humerus with routine healing, unspecified fracture morphology, subsequent encounter S42 401D    2  Thoracic spine pain M54 6                   Subjective: Pt reports she has not hasd significant pain in right UE as reported at last treatment session  Notes continued tightness in back and swelling in elbow         Objective: See treatment diary below        Precautions: as of  - AAROM right elbow, brace locked 0-90 deg    Daily Treatment Diary     Manual  9/21 9/24 9/26 10/1 10/3        PROM R elbow to natalya perf JK perf  TE perf TE perf  TE perf FAUSTO        PROM R wrist perf JK perf  TE perf TE np np        PROM R shoulder flex & abd perf JK perf  TE perf TE perf  TE np        T/S PA mobs nv   JK np   Gr IV-V perf FAUSTO        L/S roll mobs perf JK deferred  np Gr IV-V perf FAUSTO          HEP:  Elbow flex/ext AAROM, wrist AROM, , shoulder flex AROM, scap squeeze    Exercise Diary  9/21 9/24 9/26 10/1 10/3        bike   5' 5' 5'        Wand elbow flex and ext AAROM 5"x10 ea 5"x10 5''x10 5"x10 No wand x20 ea        Wand shoulder flex, abd, ext 5"x10 ea 5"x10 5''x10 nv np        Table slides - flex, abd     5"x10 ea        Digiflex  strength No digiflex 20x No  digi flex x20 No  digi flex x20 No digi  x20 x20        Right UT stretch 30"x3  30"x3 3x30'' ea 30"x3 ea HEP                     LTR nv 10"x5 10''x5 10"x5 5x15"        Book openings nv nv np          Seated pball flex 15"x5 10"x5 10''x5 10"x5 np        Thoracic ext in chair nv nv np                                                                                                                                                             Modalities                                                         Assessment: Pt tolerated treatment well today  Reported appropriate stretches and sensations with manuals and self-PROM exercises  Pt noted soreness throughout, however, appeared appropriate  Educated patient in different techniques of applying TERT/LLLD principles to home exercises and self-PROM  Plan: Progress treatment as tolerated

## 2018-10-05 ENCOUNTER — OFFICE VISIT (OUTPATIENT)
Dept: PHYSICAL THERAPY | Facility: CLINIC | Age: 33
End: 2018-10-05
Payer: OTHER MISCELLANEOUS

## 2018-10-05 DIAGNOSIS — S42.401D CLOSED FRACTURE OF DISTAL END OF RIGHT HUMERUS WITH ROUTINE HEALING, UNSPECIFIED FRACTURE MORPHOLOGY, SUBSEQUENT ENCOUNTER: Primary | ICD-10-CM

## 2018-10-05 DIAGNOSIS — M54.6 THORACIC SPINE PAIN: ICD-10-CM

## 2018-10-05 PROCEDURE — 97150 GROUP THERAPEUTIC PROCEDURES: CPT

## 2018-10-05 PROCEDURE — 97140 MANUAL THERAPY 1/> REGIONS: CPT

## 2018-10-05 NOTE — PROGRESS NOTES
Daily Note     Today's date: 10/5/2018  Patient name: En Gonzalez  :   MRN: [id number]  Referring provider: Viry Morataya MD  Dx:   Encounter Diagnosis     ICD-10-CM    1  Closed fracture of distal end of right humerus with routine healing, unspecified fracture morphology, subsequent encounter S42 401D    2  Thoracic spine pain M54 6                   Subjective: Pt reports less pain in shoulder since last visit graded 6-7/10 and continues with burning sensation and pain in elbow graded 5/10  Pt states she elevates R arm while home  She further stated performing LLLD at home after last visit        Objective: See treatment diary below        Precautions: as of  - AAROM right elbow, brace locked 0-90 deg    Daily Treatment Diary     Manual  9/21 9/24 9/26 10/1 10/3 10/5       PROM R elbow to nataly[place] perf JK perf  TE perf TE perf  TE perf [place]       PROM R wrist perf JK perf  TE perf TE np np Silver Ao       PROM R shoulder flex & abd perf JK perf  TE perf TE perf  TE np FAUSTO       T/S PA mobs nv   JK np   Gr IV-V perf FAUSTO Gr IV-V perf FAUSTO       Median nerve glide      FAUSTO       Measurements       FAUSTO       L/S roll mobs perf JK deferred  np Gr IV-V perf FAUSTO Gr IV-V perf FAUSTO               HEP:  Elbow flex/ext AAROM, wrist AROM, , shoulder flex AROM, scap squeeze    Exercise Diary  9/21 9/24 9/26 10/1 10/3 10       bike   5' 5' 5' 5'       Wand elbow flex and ext AAROM 5"x10 ea 5"x10 5''x10 5"x10 No wand x20 ea No wand x20 ea       Wand shoulder flex, abd, ext 5"x10 ea 5"x10 5''x10 nv np np       Table slides - flex, abd     5"x10 ea 5"x15 scap       Digiflex  strength No digiflex 20x No  digi flex x20 No  digi flex x20 No digi  x20 x20 x20 HEP*       Right UT stretch 30"x3  30"x3 3x30'' ea 30"x3 ea HEP ---                    LTR nv 10"x5 10''x5 10"x5 5x15" 5x15"       Book openings nv nv np          Seated pball flex 15"x5 10"x5 10''x5 10"x5 np np        Thoracic ext in chair nv nv np   np Modalities                                                         Assessment: Pt tolerated treatment well  Pt tolerated PROM elbow extension better than flexion  Resumed shlder PROM and added median nerve glides with good response  PROM measurements taken to see progress; elbow flexion 120 degrees, elbow extension 35 degrees  Right hand  strength improved from 3lbs to 8lbs since initial evaluation  Plan: Progress treatment as tolerated

## 2018-10-08 ENCOUNTER — OFFICE VISIT (OUTPATIENT)
Dept: PHYSICAL THERAPY | Facility: CLINIC | Age: 33
End: 2018-10-08
Payer: OTHER MISCELLANEOUS

## 2018-10-08 DIAGNOSIS — M54.6 THORACIC SPINE PAIN: ICD-10-CM

## 2018-10-08 DIAGNOSIS — S42.401D CLOSED FRACTURE OF DISTAL END OF RIGHT HUMERUS WITH ROUTINE HEALING, UNSPECIFIED FRACTURE MORPHOLOGY, SUBSEQUENT ENCOUNTER: Primary | ICD-10-CM

## 2018-10-08 PROCEDURE — 97140 MANUAL THERAPY 1/> REGIONS: CPT

## 2018-10-08 PROCEDURE — 97150 GROUP THERAPEUTIC PROCEDURES: CPT

## 2018-10-08 PROCEDURE — 97110 THERAPEUTIC EXERCISES: CPT

## 2018-10-08 NOTE — PROGRESS NOTES
Daily Note     Today's date: 10/8/2018  Patient name: Liseth To  : [birth year]  MRN: [id number]  Referring provider: King Rico MD  Dx:   Encounter Diagnosis     ICD-10-CM    1  Closed fracture of distal end of right humerus with routine healing, unspecified fracture morphology, subsequent encounter S42 401D    2  Thoracic spine pain M54 6                   Subjective: Pt states pain in right elbow is 5/10 today  Pt states hand was swollen over the weekend, edema management recommendations did not help; patient consumed tumeric for improvements  Patient reports feeling better after last visit compared to visit previous possibly due to manuals  Pain and numbness in right arm occurs frequently and for no cause; numbness mainly in distal extremity  Pain continues in thoracic region          Objective: See treatment diary below        Precautions: as of  - AAROM right elbow, brace locked 0-90 deg    Daily Treatment Diary     Manual  9/21 9/24 9/26 10/1 10/3 10/5 10/8      PROM R elbow to natalya perf JK perf  TE perf TE perf  TE perf FAUSTO KM KM      PROM R wrist perf JK perf  TE perf TE np np FAUSTO np      PROM R shoulder flex & abd perf JK perf  TE perf TE perf  TE np FAUSTO np      Hip internal, external, piriformis       NC      T/S PA mobs nv   JK np   Gr IV-V perf FAUSTO Gr IV-V perf FAUSTO Gr IV-V perf NC      Median nerve glide      FAUSTO NC      Measurements       FAUSTO --      L/S roll mobs perf JK deferred  np Gr IV-V perf FAUSTO Gr IV-V perf FAUSTO Gr IV-V perf NC              HEP:  Elbow flex/ext AAROM, wrist AROM, , shoulder flex AROM, scap squeeze    Exercise Diary  9/21 9/24 9/26 10/1 10/3 10/5 10/8      bike   5' 5' 5' 5' 5'      Wand elbow flex and ext AAROM 5"x10 ea 5"x10 5''x10 5"x10 No wand x20 ea No wand x20 ea No wand x20 ea      Wand shoulder flex, abd, ext 5"x10 ea 5"x10 5''x10 nv np np np      Table slides - flex, abd     5"x10 ea 5"x15 scap 5''x15      Digiflex  strength No digiflex 20x No  digi flex x20 No  digi flex x20 No digi  x20 x20 x20 HEP* x20      Right UT stretch 30"x3  30"x3 3x30'' ea 30"x3 ea HEP --- ---                   LTR nv 10"x5 10''x5 10"x5 5x15" 5x15" 5x15''      Book openings nv nv np          Seated pball flex 15"x5 10"x5 10''x5 10"x5 np np  np      Thoracic ext in chair nv nv np   np np                                                                                                                                                         Modalities                                                         Assessment: Pt tolerated treatment well  Manuals tolerated well  NC, DPT incorporated hip internal and external rotation stretch along with piriformis into manuals  Pain, burning, pressure, pinch sensation in medial and lateral epicondyle during active assisted elbow flexion  Muscular restriction in thoracic back therefore NC, DPT instructed pt to perform scapular retractions at home  Pt states feeling better post tx  Plan: Progress treatment as tolerated

## 2018-10-09 ENCOUNTER — HOSPITAL ENCOUNTER (OUTPATIENT)
Dept: RADIOLOGY | Facility: HOSPITAL | Age: 33
Discharge: HOME | End: 2018-10-09
Attending: ORTHOPAEDIC SURGERY
Payer: OTHER MISCELLANEOUS

## 2018-10-09 ENCOUNTER — OFFICE VISIT (OUTPATIENT)
Dept: OBGYN CLINIC | Facility: HOSPITAL | Age: 33
End: 2018-10-09

## 2018-10-09 VITALS
WEIGHT: 239 LBS | BODY MASS INDEX: 35.4 KG/M2 | HEIGHT: 69 IN | DIASTOLIC BLOOD PRESSURE: 75 MMHG | SYSTOLIC BLOOD PRESSURE: 111 MMHG | HEART RATE: 76 BPM

## 2018-10-09 DIAGNOSIS — Z09 FRACTURE FOLLOW-UP: ICD-10-CM

## 2018-10-09 DIAGNOSIS — Z09 FRACTURE FOLLOW-UP: Primary | ICD-10-CM

## 2018-10-09 PROCEDURE — 73060 X-RAY EXAM OF HUMERUS: CPT

## 2018-10-09 PROCEDURE — 73030 X-RAY EXAM OF SHOULDER: CPT

## 2018-10-09 PROCEDURE — 99024 POSTOP FOLLOW-UP VISIT: CPT | Performed by: ORTHOPAEDIC SURGERY

## 2018-10-09 RX ORDER — METHOCARBAMOL 750 MG/1
750 TABLET, FILM COATED ORAL 3 TIMES DAILY PRN
Qty: 60 TABLET | Refills: 0 | Status: SHIPPED | OUTPATIENT
Start: 2018-10-09

## 2018-10-09 RX ORDER — NAPROXEN 500 MG/1
500 TABLET ORAL 2 TIMES DAILY WITH MEALS
Qty: 60 TABLET | Refills: 0 | Status: SHIPPED | OUTPATIENT
Start: 2018-10-09

## 2018-10-09 NOTE — PROGRESS NOTES
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  She requests a refill of robaxin today which will be provided  Review of Systems  Review of systems negative unless otherwise specified in HPI    Past Medical History  History reviewed  No pertinent past medical history  Past Surgical History  Past Surgical History:   Procedure Laterality Date    APPENDECTOMY       SECTION      CHOLECYSTECTOMY      ORIF HUMERUS FRACTURE Right 2018    Procedure: OPEN REDUCTION W/ INTERNAL FIXATION (ORIF) DISTAL HUMERUS;  Surgeon: Erin Nielson MD;  Location: [facility];  Service: Orthopedics    TONSILLECTOMY         Current Medications  Current Outpatient Prescriptions on File Prior to Visit   Medication Sig Dispense Refill    methocarbamol (ROBAXIN) 750 mg tablet Take 1 tablet (750 mg total) by mouth every 6 (six) hours as needed for muscle spasms (Patient not taking: Reported on 2018 ) 20 tablet 0    oxyCODONE (ROXICODONE) 5 mg immediate release tablet Take 1 tablet (5 mg total) by mouth every 4 (four) hours as needed for moderate pain Take 1-2 tablets PO q 4 hours PRN Pain Max Daily Amount: 30 mg (Patient not taking: Reported on 2018 ) 30 tablet 0     No current facility-administered medications on file prior to visit          Recent Labs [facility])    0  Lab Value Date/Time   HCT 39 6 2018 0823   HGB 12 6 08/29/2018 0823   WBC 17 33 (H) 08/28/2018 0843   INR 0 99 08/28/2018 1455   GLUCOSE 126 08/28/2018 0832         Physical exam  General: Awake, Alert, Oriented  HEENT: No scleral injection, no evidence of facial trauma  Heart: Extremities warm and well perfused  Lungs: No audible wheezing  ·   right Elbow  · Incision healed well with no drainage or erythema  · Median, ulnar, radial nerve motor and sensory function intact  · Extremity warm and well perfused  · PROM elbow 5-95 degrees    Procedure  None    Imaging  Plain films of right elbow reveal healing distal humerus fracture in satisfactory alignment with no evidence of hardware failure  Plain films of right shoulder reveal no fracture or dislocation    A/P: 33F s/p ORIF R distal humerus fracture with satisfactory healing and persistent pain and numbness to fingers  NWB RUE in elbow brace  Aggressive PROM exercises with PT as well as shoulder stretching/range of motion  Oral pain meds prn, robaxin refilled today and naprosyn started  FOllow-up 1 month for repeat exam and right elbow imaging    James Olmos  10/09/18

## 2018-10-10 ENCOUNTER — OFFICE VISIT (OUTPATIENT)
Dept: PHYSICAL THERAPY | Facility: CLINIC | Age: 33
End: 2018-10-10
Payer: OTHER MISCELLANEOUS

## 2018-10-10 DIAGNOSIS — S42.401D CLOSED FRACTURE OF DISTAL END OF RIGHT HUMERUS WITH ROUTINE HEALING, UNSPECIFIED FRACTURE MORPHOLOGY, SUBSEQUENT ENCOUNTER: Primary | ICD-10-CM

## 2018-10-10 DIAGNOSIS — M54.6 THORACIC SPINE PAIN: ICD-10-CM

## 2018-10-10 PROCEDURE — 97140 MANUAL THERAPY 1/> REGIONS: CPT

## 2018-10-10 PROCEDURE — 97110 THERAPEUTIC EXERCISES: CPT

## 2018-10-10 NOTE — PROGRESS NOTES
Daily Note     Today's date: 10/10/2018  Patient name: Bret Barrera  : [birth year]  MRN: [id number]  Referring provider: Marybel Camilo MD  Dx:   Encounter Diagnosis     ICD-10-CM    1  Closed fracture of distal end of right humerus with routine healing, unspecified fracture morphology, subsequent encounter S42 401D    2  Thoracic spine pain M54 6                   Subjective: Patient pain 5/10 in right elbow prior to therapy  Pt had doctors appt yesterday and doctor wants more aggressive stretching performed to shoulder and elbow to improve ROM         Objective: See treatment diary below        Precautions: as of  - AAROM right elbow, brace locked 0-90 deg    Daily Treatment Diary     Manual  9/21 9/24 9/26 10/1 10/3 10/5 10/8 10/10     PROM R elbow to natalya perf JK perf  TE perf TE perf  TE perf FAUSTO KM KM KM     PROM R wrist perf JK perf  TE perf TE np np FAUSTO np DTM to distal bicep     PROM R shoulder flex & abd perf JK perf  TE perf TE perf  TE np FAUSTO np KM     Hip internal, external, piriformis       NC      T/S PA mobs nv   JK np   Gr IV-V perf FAUSTO Gr IV-V perf FAUSTO Gr IV-V perf NC      Median nerve glide      FAUSTO NC      Measurements       FAUSTO --      L/S roll mobs perf JK deferred  np Gr IV-V perf FAUSOT Gr IV-V perf FAUSTO Gr IV-V perf NC              HEP:  Elbow flex/ext AAROM, wrist AROM, , shoulder flex AROM, scap squeeze    Exercise Diary  9/21 9/24 9/26 10/1 10/3 10/5 10/8 10/10     bike   5' 5' 5' 5' 5' 5'     Wand elbow flex and ext AAROM 5"x10 ea 5"x10 5''x10 5"x10 No wand x20 ea No wand x20 ea No wand x20 ea No hand 3''x30 ea     Wand shoulder flex, abd, ext 5"x10 ea 5"x10 5''x10 nv np np np np     Table slides - flex, abd     5"x10 ea 5"x15 scap 5''x15 10''x10     Digiflex  strength No digiflex 20x No  digi flex x20 No  digi flex x20 No digi  x20 x20 x20 HEP* x20 x30     Right UT stretch 30"x3  30"x3 3x30'' ea 30"x3 ea HEP --- --- ---                  LTR nv 10"x5 10''x5 10"x5 5x15" 5x15" 5x15'' 5x15''     Book openings nv nv np     np     Seated pball flex 15"x5 10"x5 10''x5 10"x5 np np  np np     Thoracic ext in chair nv nv np   np np np                                                                                                                                                        Modalities                                                         Assessment: Pt tolerated tx well  Patient tolerated increase in reps and duration during active assisted elbow flexion and extension  Pt tolerated increase intensity in elbow and shoulder PROM  Plan: Progress treatment as tolerated

## 2018-10-12 ENCOUNTER — OFFICE VISIT (OUTPATIENT)
Dept: PHYSICAL THERAPY | Facility: CLINIC | Age: 33
End: 2018-10-12
Payer: OTHER MISCELLANEOUS

## 2018-10-12 DIAGNOSIS — S42.401D CLOSED FRACTURE OF DISTAL END OF RIGHT HUMERUS WITH ROUTINE HEALING, UNSPECIFIED FRACTURE MORPHOLOGY, SUBSEQUENT ENCOUNTER: Primary | ICD-10-CM

## 2018-10-12 DIAGNOSIS — M54.6 THORACIC SPINE PAIN: ICD-10-CM

## 2018-10-12 PROCEDURE — 97140 MANUAL THERAPY 1/> REGIONS: CPT

## 2018-10-12 PROCEDURE — 97110 THERAPEUTIC EXERCISES: CPT

## 2018-10-12 NOTE — PROGRESS NOTES
Daily Note     Today's date: 10/12/2018  Patient name: Vickie Dixon  :   MRN: [id number]  Referring provider: Jose Hernandez MD  Dx:   Encounter Diagnosis     ICD-10-CM    1  Closed fracture of distal end of right humerus with routine healing, unspecified fracture morphology, subsequent encounter S42 401D    2  Thoracic spine pain M54 6                   Subjective: Pt presents to PT reporting pain in R shoulder and elbow grading the pain a 6/10  Pt state she is attempting to perform stretches at home  Pt reports decreased edema in R hand and fingers  Pt reports mild increase in pain from stretching grading the pain a 7/10  Pt was started late in error        Objective: See treatment diary below    Precautions: as of  - AAROM right elbow, brace locked 0-90 deg    Daily Treatment Diary     Manual  9/21 9/24 9/26 10/1 10/3 10/5 10/8 10/10 10/12    PROM R elbow to natalya perf JK perf  TE perf TE perf  TE perf FAUSTO KM KM KM PK    PROM R wrist perf JK perf  TE perf TE np np FAUSTO np DTM to distal bicep PK    PROM R shoulder flex & abd perf JK perf  TE perf TE perf  TE np FAUSTO np KM PK    Hip internal, external, piriformis       NC      T/S PA mobs nv   JK np   Gr IV-V perf FAUSTO Gr IV-V perf FAUSTO Gr IV-V perf NC      Median nerve glide      FAUSTO NC      Measurements       FAUSTO --      L/S roll mobs perf JK deferred  np Gr IV-V perf FAUSTO Gr IV-V perf FAUSTO Gr IV-V perf NC                  Exercise Diary  9/21 9/24 9/26 10/1 10/3 10/5 10/8 10/10 10/12    bike   5' 5' 5' 5' 5' 5'     Wand elbow flex and ext AAROM 5"x10 ea 5"x10 5''x10 5"x10 No wand x20 ea No wand x20 ea No wand x20 ea No hand 3''x30 ea No hand 3''x30 ea    Wand shoulder flex, abd, ext 5"x10 ea 5"x10 5''x10 nv np np np np np    Table slides - flex, abd     5"x10 ea 5"x15 scap 5''x15 10''x10 10''x10    Digiflex  strength No digiflex 20x No  digi flex x20 No  digi flex x20 No digi  x20 x20 x20 HEP* x20 x30 Yellow  , digits  1' ea    Right UT stretch 30"x3  30"x3 3x30'' ea 30"x3 ea HEP --- --- --- --                 LTR nv 10"x5 10''x5 10"x5 5x15" 5x15" 5x15'' 5x15'' 5x15''    Book openings nv nv np     np     Seated pball flex 15"x5 10"x5 10''x5 10"x5 np np  np np     Thoracic ext in chair nv nv np   np np np                                                                                                                                                        Modalities                                                       Time includes rest periods between stretches  Assessment: Pt demonstrates fair tolerance to manual to increase flexibility in R elbow and shoulder  Patient demonstrated fatigue post treatment and would benefit from continued PT  Plan: Continue per plan of care

## 2018-10-15 ENCOUNTER — APPOINTMENT (OUTPATIENT)
Dept: PHYSICAL THERAPY | Facility: CLINIC | Age: 33
End: 2018-10-15
Payer: OTHER MISCELLANEOUS

## 2018-10-16 ENCOUNTER — OFFICE VISIT (OUTPATIENT)
Dept: PHYSICAL THERAPY | Facility: CLINIC | Age: 33
End: 2018-10-16
Payer: OTHER MISCELLANEOUS

## 2018-10-16 DIAGNOSIS — Z09 FRACTURE FOLLOW-UP: ICD-10-CM

## 2018-10-16 DIAGNOSIS — M54.6 THORACIC SPINE PAIN: ICD-10-CM

## 2018-10-16 DIAGNOSIS — S42.401D CLOSED FRACTURE OF DISTAL END OF RIGHT HUMERUS WITH ROUTINE HEALING, UNSPECIFIED FRACTURE MORPHOLOGY, SUBSEQUENT ENCOUNTER: Primary | ICD-10-CM

## 2018-10-16 PROCEDURE — 97010 HOT OR COLD PACKS THERAPY: CPT | Performed by: PHYSICAL THERAPIST

## 2018-10-16 PROCEDURE — 97110 THERAPEUTIC EXERCISES: CPT | Performed by: PHYSICAL THERAPIST

## 2018-10-16 PROCEDURE — 97140 MANUAL THERAPY 1/> REGIONS: CPT | Performed by: PHYSICAL THERAPIST

## 2018-10-16 NOTE — PROGRESS NOTES
Daily Note     Today's date: 10/16/2018  Patient name: Rafael Lindquist  : [birth year]  MRN: [id number]  Referring provider: Justin Foster MD  Dx:   Encounter Diagnosis     ICD-10-CM    1  Closed fracture of distal end of right humerus with routine healing, unspecified fracture morphology, subsequent encounter S42 401D    2  Thoracic spine pain M54 6                   Subjective: Pt arrives at therapy reporting continued soreness and discomfort in right elbow and right shoulder  Reports she has tenderness in lateral right upper arm and along incision  States she continues to have intermittent numbness along right scapular boarder and in right hand  Objective: See treatment diary below    Precautions: as of  - AAROM right elbow, brace locked 0-90 deg    Daily Treatment Diary     Manual  9/21 9/24 9/26 10/1 10/3 10/5 10/8 10/10 10/12 10/16   PROM R elbow to natalya perf JK perf  TE perf TE perf  TE perf FAUSTO KM KM KM PK FAUSTO   PROM R wrist perf JK perf  TE perf TE np np FAUSTO np DTM to distal bicep PK np   PROM R shoulder flex & abd perf JK perf  TE perf TE perf  TE np FAUSTO np KM PK FAUSTO all planes   Hip internal, external, piriformis       NC      T/S PA mobs nv   JK np   Gr IV-V perf FAUSTO Gr IV-V perf FAUSTO Gr IV-V perf NC   Gr   Iv-V   FAUSTO   Median nerve glide      FAUSTO NC      Measurements       FAUSTO --      L/S roll mobs perf JK deferred  np Gr IV-V perf FAUSTO Gr IV-V perf FAUSTO Gr IV-V perf NC       np           Exercise Diary  9/21 9/24 9/26 10/1 10/3 10/5 10/8 10/10 10/12 10/16   bike   5' 5' 5' 5' 5' 5'  5' w/ MHP   Wand elbow flex and ext AAROM 5"x10 ea 5"x10 5''x10 5"x10 No wand x20 ea No wand x20 ea No wand x20 ea No hand 3''x30 ea No hand 3''x30 ea    Wand shoulder flex, abd, ext 5"x10 ea 5"x10 5''x10 nv np np np np np    Table slides - flex, abd     5"x10 ea 5"x15 scap 5''x15 10''x10 10''x10    Digiflex  strength No digiflex 20x No  digi flex x20 No  digi flex x20 No digi  x20 x20 x20 HEP* x20 x30 Yellow  , digits  1' ea Red , digits, 2' ea   Right UT stretch 30"x3  30"x3 3x30'' ea 30"x3 ea HEP --- --- --- --                 LTR nv 10"x5 10''x5 10"x5 5x15" 5x15" 5x15'' 5x15'' 5x15''    Book openings nv nv np     np     Seated pball flex 15"x5 10"x5 10''x5 10"x5 np np  np np     Thoracic ext in chair nv nv np   np np np                                                                                                                                                        Modalities                                                       Assessment: Pt demonstrates fair tolerance to manual to increase flexibility in R elbow and shoulder  Patient demonstrated fatigue post treatment and would benefit from continued PT  Focus of today's session on manuals due to continued hypomobliity  Plan: Continue per plan of care

## 2018-10-17 ENCOUNTER — OFFICE VISIT (OUTPATIENT)
Dept: PHYSICAL THERAPY | Facility: CLINIC | Age: 33
End: 2018-10-17
Payer: OTHER MISCELLANEOUS

## 2018-10-17 DIAGNOSIS — S42.401D CLOSED FRACTURE OF DISTAL END OF RIGHT HUMERUS WITH ROUTINE HEALING, UNSPECIFIED FRACTURE MORPHOLOGY, SUBSEQUENT ENCOUNTER: Primary | ICD-10-CM

## 2018-10-17 DIAGNOSIS — Z09 FRACTURE FOLLOW-UP: ICD-10-CM

## 2018-10-17 DIAGNOSIS — M54.6 THORACIC SPINE PAIN: ICD-10-CM

## 2018-10-17 PROCEDURE — G8990 OTHER PT/OT CURRENT STATUS: HCPCS

## 2018-10-17 PROCEDURE — 97140 MANUAL THERAPY 1/> REGIONS: CPT

## 2018-10-17 PROCEDURE — G8991 OTHER PT/OT GOAL STATUS: HCPCS

## 2018-10-17 PROCEDURE — 97110 THERAPEUTIC EXERCISES: CPT

## 2018-10-17 PROCEDURE — 97010 HOT OR COLD PACKS THERAPY: CPT

## 2018-10-17 NOTE — PROGRESS NOTES
Daily Note     Today's date: 10/17/2018  Patient name: Dayne Villagomez  :   MRN: [id number]  Referring provider: Haylee Valiente MD  Dx:   Encounter Diagnosis     ICD-10-CM    1  Closed fracture of distal end of right humerus with routine healing, unspecified fracture morphology, subsequent encounter S42 401D    2  Thoracic spine pain M54 6    3  Fracture follow-up Z09                   Subjective: Pt reports continued soreness and discomfort in right elbow and right shoulder  She c/o tenderness in lateral right upper arm and along incision  Objective: See treatment diary below    Precautions: as of  - AAROM right elbow, brace locked 0-90 deg    Daily Treatment Diary     Manual  10/17 9/24 9/26 10/1 10/3 10/5 10/8 10/10 10/12 10/16   PROM R elbow to natalya TE perf  TE perf TE perf  TE perf FAUSTO KM KM KM PK FAUSTO   PROM R wrist np perf  TE perf TE np np FAUSTO np DTM to distal bicep PK np   PROM R shoulder flex & abd TE all planes perf  TE perf TE perf  TE np FAUSTO np KM PK FAUSTO all planes   Hip internal, external, piriformis np      NC      T/S PA mobs np   JK np   Gr IV-V perf FAUSTO Gr IV-V perf FAUSTO Gr IV-V perf NC   Gr   Iv-V   FAUSTO   Median nerve glide      FAUSTO NC      Measurements       FAUSTO --      L/S roll mobs perf JK deferred  np Gr IV-V perf FAUSTO Gr IV-V perf FAUSTO Gr IV-V perf NC       np           Exercise Diary  10/17 9/24 9/26 10/1 10/3 10/5 10/8 10/10 10/12 10/16   bike nv    5' 5' 5' 5' 5' 5'  5' w/ MHP   Wand elbow flex and ext AAROM  5"x10 5''x10 5"x10 No wand x20 ea No wand x20 ea No wand x20 ea No hand 3''x30 ea No hand 3''x30 ea    Wand shoulder flex, abd, ext np 5"x10 5''x10 nv np np np np np    Table slides - flex, abd 10"x10    5"x10 ea 5"x15 scap 5''x15 10''x10 10''x10 10"x10   Digiflex  strength Red , digits  2' ea No  digi flex x20 No  digi flex x20 No digi  x20 x20 x20 HEP* x20 x30 Yellow  , digits  1' ea Red , digits, 2' ea   Right UT stretch HEP 30"x3 3x30'' ea 30"x3 ea HEP --- --- --- --                 LTR 15"x5 10"x5 10''x5 10"x5 5x15" 5x15" 5x15'' 5x15'' 5x15''    Book openings np nv np     np     Seated pball flex 15"x5 10"x5 10''x5 10"x5 np np  np np     Thoracic ext in chair np nv np   np np np                                                                                                                                                        Modalities  10/17            MHP pre x10'            CP post x10'                           Assessment: Pt demonstrates fair tolerance to manual to increase flexibility in R elbow and shoulder  Patient demonstrated fatigue post treatment and would benefit from continued PT  Resumed exercises as listed with good response  Concluded with CP to R elbow post treatment due to increased soreness reported upon completion of exercises  Plan: Continue per plan of care

## 2018-10-18 ENCOUNTER — APPOINTMENT (OUTPATIENT)
Dept: PHYSICAL THERAPY | Facility: CLINIC | Age: 33
End: 2018-10-18
Payer: OTHER MISCELLANEOUS

## 2018-10-19 ENCOUNTER — OFFICE VISIT (OUTPATIENT)
Dept: PHYSICAL THERAPY | Facility: CLINIC | Age: 33
End: 2018-10-19
Payer: OTHER MISCELLANEOUS

## 2018-10-19 DIAGNOSIS — Z09 FRACTURE FOLLOW-UP: ICD-10-CM

## 2018-10-19 DIAGNOSIS — S42.401D CLOSED FRACTURE OF DISTAL END OF RIGHT HUMERUS WITH ROUTINE HEALING, UNSPECIFIED FRACTURE MORPHOLOGY, SUBSEQUENT ENCOUNTER: Primary | ICD-10-CM

## 2018-10-19 DIAGNOSIS — M54.6 THORACIC SPINE PAIN: ICD-10-CM

## 2018-10-19 PROCEDURE — 97140 MANUAL THERAPY 1/> REGIONS: CPT | Performed by: PHYSICAL THERAPIST

## 2018-10-19 PROCEDURE — 97112 NEUROMUSCULAR REEDUCATION: CPT | Performed by: PHYSICAL THERAPIST

## 2018-10-19 PROCEDURE — 97110 THERAPEUTIC EXERCISES: CPT | Performed by: PHYSICAL THERAPIST

## 2018-10-19 NOTE — PROGRESS NOTES
Daily Note     Today's date: 10/19/2018  Patient name: Álvaro Winter  : [birth year]  MRN: [id number]  Referring provider: Rogena Collet, MD  Dx:   Encounter Diagnosis     ICD-10-CM    1  Closed fracture of distal end of right humerus with routine healing, unspecified fracture morphology, subsequent encounter S42 401D    2  Thoracic spine pain M54 6    3  Fracture follow-up Z09                   Subjective: Pt reports continued soreness and discomfort in right elbow and right shoulder  Reports continued intermittent swelling, however, states she feels the tingling and paresthesias have been less frequent  Objective: See treatment diary below    Precautions: as of  - AAROM right elbow, brace locked 0-90 deg    Daily Treatment Diary     Manual  10/17 10/19 9/26 10/1 10/3 10/5 10/8 10/10 10/12 10/16   PROM R elbow to natalya TE perf  FAUSTO perf TE perf  TE perf FAUSTO KM KM KM PK FAUSTO   PROM R wrist np np perf TE np np FAUSTO np DTM to distal bicep PK np   PROM R shoulder flex & abd TE all planes perf FAUSTO perf TE perf  TE np FAUSTO np KM PK FAUSTO all planes   Hip internal, external, piriformis np np     NC      T/S PA mobs np np  JK np   Gr IV-V perf FAUSTO Gr IV-V perf FAUSTO Gr IV-V perf NC   Gr   Iv-V   FAUSTO   Median nerve glide      FAUSTO NC      Measurements       FAUSTO --      L/S roll mobs perf JK np  np Gr IV-V perf FAUSTO Gr IV-V perf FAUSTO Gr IV-V perf NC       np           Exercise Diary  10/17 10/19 9/26 10/1 10/3 10/5 10/8 10/10 10/12 10/16   bike nv   5' 5' 5' 5' 5' 5' 5'  5' w/ MHP   Wand elbow flex and ext AAROM   5''x10 5"x10 No wand x20 ea No wand x20 ea No wand x20 ea No hand 3''x30 ea No hand 3''x30 ea    Elbow AROM over towel roll  5"x10           Wand shoulder flex, abd, ext np D/C 5''x10 nv np np np np np    AROM shoulder flex & scap  2x10 ea           Table slides - flex, abd 10"x10 D/C   5"x10 ea 5"x15 scap 5''x15 10''x10 10''x10 10"x10   Digiflex  strength Red , digits  2' ea Green w/ scap retract  x2' No  digi flex x20 No digi  x20 x20 x20 HEP* x20 x30 Yellow  , digits  1' ea Red , digits, 2' ea   Right UT stretch HEP -- 3x30'' ea 30"x3 ea HEP --- --- --- --                 LTR 15"x5 np 10''x5 10"x5 5x15" 5x15" 5x15'' 5x15'' 5x15''    Book openings np  np     np     Seated pball flex 15"x5 np 10''x5 10"x5 np np  np np     Thoracic ext in chair np  np   np np np                  Triceps isometrics  5"x10                                                                                                                                    Modalities  10/17            MHP pre x10'            CP post x10'                           Assessment: Pt demonstrates fair tolerance to manual to increase flexibility in R elbow and shoulder  Patient demonstrated fatigue post treatment and would benefit from continued PT  Improved tolerance for manual elbow flexion with radio-ulnar distraction  Initiated AROM with fair tolerance and fatigue noted  Plan: Continue per plan of care

## 2018-10-22 ENCOUNTER — OFFICE VISIT (OUTPATIENT)
Dept: PHYSICAL THERAPY | Facility: CLINIC | Age: 33
End: 2018-10-22
Payer: OTHER MISCELLANEOUS

## 2018-10-22 DIAGNOSIS — S42.401D CLOSED FRACTURE OF DISTAL END OF RIGHT HUMERUS WITH ROUTINE HEALING, UNSPECIFIED FRACTURE MORPHOLOGY, SUBSEQUENT ENCOUNTER: Primary | ICD-10-CM

## 2018-10-22 DIAGNOSIS — Z09 FRACTURE FOLLOW-UP: ICD-10-CM

## 2018-10-22 DIAGNOSIS — M54.6 THORACIC SPINE PAIN: ICD-10-CM

## 2018-10-22 PROCEDURE — 97110 THERAPEUTIC EXERCISES: CPT

## 2018-10-22 PROCEDURE — 97112 NEUROMUSCULAR REEDUCATION: CPT

## 2018-10-22 PROCEDURE — 97140 MANUAL THERAPY 1/> REGIONS: CPT

## 2018-10-22 NOTE — PROGRESS NOTES
Daily Note     Today's date: 10/22/2018  Patient name: Chrystal Frankel  : [birth year]  MRN: [id number]  Referring provider: Wang Box MD  Dx:   Encounter Diagnosis     ICD-10-CM    1  Closed fracture of distal end of right humerus with routine healing, unspecified fracture morphology, subsequent encounter S42 401D    2  Thoracic spine pain M54 6    3  Fracture follow-up Z09               Subjective: Pt is frustrated that she is unable to extend her elbow and cannot activate her triceps beyond "twitching"  She reports continued sensitivity around scar and pain in R hand around hypothenar and thumb  Objective: See treatment diary below    Precautions: AROM right elbow, brace PRN    Daily Treatment Diary   Manual  10/17 10/19 10/22  10/3 10/5 10/8 10/10 10/12 10/16   PROM R elbow to nataly[place] TE perf  FAUSTO [address]   PROM R wrist np np ---  np FAUSTO np DTM to distal bicep PK np   PROM R shoulder flex & abd TE all planes perf FAUSTO SH  np FAUSTO np KM PK FAUSTO all planes   Hip internal, external, piriformis np np ---    NC      T/S PA mobs np np ---  Gr IV-V perf FAUSTO Gr IV-V perf FAUSTO Gr IV-V perf NC   Gr   Iv-V   FAUSTO   Median nerve glide   ---   FAUSTO NC      Measurements    ---   FAUSTO --      L/S roll mobs perf JK np ---  Gr IV-V perf FAUSTO Gr IV-V perf FAUSTO Gr IV-V perf NC       np       Exercise Diary  10/17 10/19 10/22  10/3 10/5 10/8 10/10 10/12 10/16   bike nv   5' 5' w/MHP  5' 5' 5' 5'  5' w/ MHP   Wand elbow flex and ext AAROM  --- D/C  No wand x20 ea No wand x20 ea No wand x20 ea No hand 3''x30 ea No hand 3''x30 ea    Elbow AROM over towel roll  5"x10 5"x10 ea          AROM shoulder flex & scap  2x10 ea 2x10 ea          Table slides - flex, abd 10"x10 D/C ---  5"x10 ea 5"x15 scap 5''x15 10''x10 10''x10 10"x10   Digiflex  strength Red , digits  2' ea Green w/ scap retract  x2' Green digi scap ret  2' ea  x20 x20 HEP* x20 x30 Yellow  , digits  1' ea Red , digits, 2' ea   LTR 15"x5 np ---  6C85" 5x15" 5x15'' 5x15'' 5x15''    Book openings np  ---     np     Seated pball flex 15"x5 np ---  np np  np np     Thoracic ext in chair np  ---   np np np     Triceps isometrics  5"x10 attempted                                                                Modalities  10/17  10/22          MHP pre x10'  5' pre          CP post x10'  10' post to elbow and shoulder            Assessment: Tolerated treatment well  Patient demonstrated fatigue post treatment, exhibited good technique with therapeutic exercises and would benefit from continued PT to address current ROM and strength deficits and improve triceps activation  Plan: Continue per plan of care  Progress treatment as tolerated        Lindsey Tolentino, PTA

## 2018-10-24 ENCOUNTER — OFFICE VISIT (OUTPATIENT)
Dept: PHYSICAL THERAPY | Facility: CLINIC | Age: 33
End: 2018-10-24
Payer: OTHER MISCELLANEOUS

## 2018-10-24 DIAGNOSIS — S42.401D CLOSED FRACTURE OF DISTAL END OF RIGHT HUMERUS WITH ROUTINE HEALING, UNSPECIFIED FRACTURE MORPHOLOGY, SUBSEQUENT ENCOUNTER: Primary | ICD-10-CM

## 2018-10-24 DIAGNOSIS — M54.6 THORACIC SPINE PAIN: ICD-10-CM

## 2018-10-24 DIAGNOSIS — Z09 FRACTURE FOLLOW-UP: ICD-10-CM

## 2018-10-24 PROCEDURE — 97112 NEUROMUSCULAR REEDUCATION: CPT | Performed by: PHYSICAL THERAPIST

## 2018-10-24 PROCEDURE — 97110 THERAPEUTIC EXERCISES: CPT | Performed by: PHYSICAL THERAPIST

## 2018-10-24 PROCEDURE — 97010 HOT OR COLD PACKS THERAPY: CPT | Performed by: PHYSICAL THERAPIST

## 2018-10-24 PROCEDURE — 97140 MANUAL THERAPY 1/> REGIONS: CPT | Performed by: PHYSICAL THERAPIST

## 2018-10-24 NOTE — PROGRESS NOTES
Daily Note     Today's date: 10/24/2018  Patient name: Moustapha Quevedo  :   MRN: [id number]  Referring provider: Maranda Dakin, MD  Dx:   No diagnosis found  Subjective: Pt comes to therapy reporting she has been having burning in right elbow and upper arm  Notes the soreness has been present the past 2 days  Objective: See treatment diary below    Precautions: AROM right elbow, brace PRN    Daily Treatment Diary   Manual  10/17 10/19 10/22 10/24 10/3 10/5 10/8 10/10 10/12 10/16   PROM R elbow to nataly[place] TE perf  FAUSTO [address] R wrist np np ---  np FAUSTO np DTM to distal bicep PK np   PROM R shoulder flex & abd TE all planes perf FAUSTO SH FAUSTO np FAUSTO np KM PK FAUSTO all planes   Hip internal, external, piriformis np np ---    NC      T/S PA mobs np np ---  Gr IV-V perf FAUSTO Gr IV-V perf FAUSTO Gr IV-V perf NC   Gr   Iv-V   FAUSTO   Median nerve glide   --- Sueanne Codding NC      Measurements    ---   FAUSTO --      L/S roll mobs perf JK np ---  Gr IV-V perf FAUSTO Gr IV-V perf FAUSTO Gr IV-V perf NC       np       Exercise Diary  10/17 10/19 10/22 10/24 10/3 10/5 10/8 10/10 10/12 10/16   bike nv   5' 5' w/MHP np 5' 5' 5' 5'  5' w/ MHP   Wand elbow flex and ext AAROM  --- D/C -- No wand x20 ea No wand x20 ea No wand x20 ea No hand 3''x30 ea No hand 3''x30 ea    Elbow extension AROM    sidelyin  2x10         Prone row, I, T    2x10 ea         Elbow AROM over towel roll  5"x10 5"x10 ea 3#   2x10 no towel         AROM shoulder flex & scap  2x10 ea 2x10 ea 2x10 ea         Wall ladder - flex & scap    2' ea         Table slides - flex, abd 10"x10 D/C ---  5"x10 ea 5"x15 scap 5''x15 10''x10 10''x10 10"x10   Digiflex  strength Red , digits  2' ea Green w/ scap retract  x2' Green digi scap ret  2' ea np x20 x20 HEP* x20 x30 Yellow  , digits  1' ea Red , digits, 2' ea   LTR 15"x5 np ---  5x15" 5x15" 5x15'' 5x15'' Geovanny Boyce''    Book openings np  ---     np     Seated pball flex 15"x5 np ---  np np  np np Thoracic ext in chair np  ---   np np np     Triceps isometrics  5"x10 attempted np                                                               Modalities  10/17  10/22 10/24         MHP pre x10'  5' pre 10' pre         CP post x10'  10' post to elbow and shoulder np           Assessment: Tolerated treatment well  Patient demonstrated fatigue post treatment, exhibited good technique with therapeutic exercises and would benefit from continued PT  Challenged with PRE's and addition of weighted resistance to active motions  Soreness at end of session, however, encouraged to continue as able  Plan: Continue per plan of care  Progress treatment as tolerated        Javier Kiser, PT

## 2018-10-26 ENCOUNTER — OFFICE VISIT (OUTPATIENT)
Dept: PHYSICAL THERAPY | Facility: CLINIC | Age: 33
End: 2018-10-26
Payer: OTHER MISCELLANEOUS

## 2018-10-26 DIAGNOSIS — M54.6 THORACIC SPINE PAIN: ICD-10-CM

## 2018-10-26 DIAGNOSIS — Z09 FRACTURE FOLLOW-UP: ICD-10-CM

## 2018-10-26 DIAGNOSIS — S42.401D CLOSED FRACTURE OF DISTAL END OF RIGHT HUMERUS WITH ROUTINE HEALING, UNSPECIFIED FRACTURE MORPHOLOGY, SUBSEQUENT ENCOUNTER: Primary | ICD-10-CM

## 2018-10-26 PROCEDURE — 97140 MANUAL THERAPY 1/> REGIONS: CPT | Performed by: PHYSICAL THERAPIST

## 2018-10-26 PROCEDURE — 97110 THERAPEUTIC EXERCISES: CPT | Performed by: PHYSICAL THERAPIST

## 2018-10-26 NOTE — PROGRESS NOTES
Daily Note     Today's date: 10/26/2018  Patient name: Summer New  :   MRN: [id number]  Referring provider: Merlin Patrick, MD  Dx:   Encounter Diagnosis     ICD-10-CM    1  Closed fracture of distal end of right humerus with routine healing, unspecified fracture morphology, subsequent encounter S42 401D    2  Thoracic spine pain M54 6    3  Fracture follow-up Z09               Subjective: Pt comes to therapy reporting continued stiffness and tightness in elbow and upper right arm  States she has been having less tingling in right hand, however, continues to note paresthesias and discomfort in area of base of 1st MCP joint and ventral aspect of right wrist  Denies back pain at present time, however, notes she continues to feel tightness in right medial scapular boarder  Objective: See treatment diary below    Precautions: AROM right elbow, brace PRN    Daily Treatment Diary   Manual  10/17 10/19 10/22 10/24 10/26 10/5 10/8 10/10 10/12 10/16   PROM R elbow to natalya TE perf  FAUSTO [address] R wrist np np ---  perf [place] np DTM to distal bicep PK np   PROM R shoulder flex & abd TE all planes perf FAUSTO SH FAUSTO np FAUSTO np KM PK FAUSTO all planes   Hip internal, external, piriformis np np ---    NC      T/S PA mobs np np ---  np Gr IV-V perf FAUSTO Gr IV-V perf NC   Gr   Iv-V   FAUSTO   Median nerve glide   --- Dunia Hou NC      Measurements    ---   FAUSTO --      L/S roll mobs perf JK np ---   Gr IV-V perf FAUSTO Gr IV-V perf NC       np       Exercise Diary  10/17 10/19 10/22 10/24 10/26 10/5 10/8 10/10 10/12 10/16   bike nv   5' 5' w/MHP np np 5' 5' 5'  5' w/ MHP   Wand elbow flex and ext AAROM  --- D/C -- np No wand x20 ea No wand x20 ea No hand 3''x30 ea No hand 3''x30 ea    Elbow extension AROM    sidelyin  2x10 Sidelying 2x10        Prone row, I, T    2x10 ea 2x10 ea        Elbow AROM over towel roll  5"x10 5"x10 ea 3#   2x10 no towel 2x10 no towel        AROM shoulder flex & scap  2x10 ea 2x10 ea 2x10 ea np        Wall ladder - flex & scap    2' ea         Table slides - flex, abd 10"x10 D/C ---   5"x15 scap 5''x15 10''x10 10''x10 10"x10   Digiflex  strength Red , digits  2' ea Green w/ scap retract  x2' Green digi scap ret  2' ea np  x20 HEP* x20 x30 Yellow  , digits  1' ea Red , digits, 2' ea   LTR 15"x5 np ---   2G53" 5x15'' 5x15'' 5x15''    Book openings np  ---     np     Seated pball flex 15"x5 np ---   np  np np     Thoracic ext in chair np  ---   np np np     Triceps isometrics  5"x10 attempted np                                                               Modalities  10/17  10/22 10/24 10/26        MHP pre x10'  5' pre 10' pre 10' pre        CP post x10'  10' post to elbow and shoulder np 10' post          Assessment: Tolerated treatment well  Patient demonstrated fatigue post treatment, exhibited good technique with therapeutic exercises and would benefit from continued PT  Increased hypomobility of biceps and elbow flexors, with palpable hypertonicity of biceps muscle belly  Focus of today's session on manuals and restoration of passive mobility  Resume TE's next visit  Plan: Continue per plan of care  Progress treatment as tolerated        Isabel Almaguer, PT

## 2018-10-29 ENCOUNTER — APPOINTMENT (OUTPATIENT)
Dept: PHYSICAL THERAPY | Facility: CLINIC | Age: 33
End: 2018-10-29
Payer: OTHER MISCELLANEOUS

## 2018-10-31 ENCOUNTER — APPOINTMENT (OUTPATIENT)
Dept: PHYSICAL THERAPY | Facility: CLINIC | Age: 33
End: 2018-10-31
Payer: OTHER MISCELLANEOUS

## 2018-11-13 ENCOUNTER — HOSPITAL ENCOUNTER (OUTPATIENT)
Dept: RADIOLOGY | Facility: HOSPITAL | Age: 33
Discharge: HOME | End: 2018-11-13
Attending: ORTHOPAEDIC SURGERY

## 2018-11-13 ENCOUNTER — OFFICE VISIT (OUTPATIENT)
Dept: OBGYN CLINIC | Facility: HOSPITAL | Age: 33
End: 2018-11-13

## 2018-11-13 VITALS
BODY MASS INDEX: 34.22 KG/M2 | WEIGHT: 239 LBS | HEIGHT: 70 IN | DIASTOLIC BLOOD PRESSURE: 76 MMHG | HEART RATE: 71 BPM | SYSTOLIC BLOOD PRESSURE: 117 MMHG

## 2018-11-13 DIAGNOSIS — S42.391D OTHER CLOSED FRACTURE OF SHAFT OF RIGHT HUMERUS WITH ROUTINE HEALING, SUBSEQUENT ENCOUNTER: ICD-10-CM

## 2018-11-13 DIAGNOSIS — Z48.89 AFTERCARE FOLLOWING SURGERY: Primary | ICD-10-CM

## 2018-11-13 DIAGNOSIS — R29.898 ARM WEAKNESS: ICD-10-CM

## 2018-11-13 DIAGNOSIS — F43.10 POST-TRAUMATIC STRESS: ICD-10-CM

## 2018-11-13 DIAGNOSIS — Z48.89 AFTERCARE FOLLOWING SURGERY: ICD-10-CM

## 2018-11-13 PROCEDURE — 99024 POSTOP FOLLOW-UP VISIT: CPT | Performed by: ORTHOPAEDIC SURGERY

## 2018-11-13 PROCEDURE — 73060 X-RAY EXAM OF HUMERUS: CPT

## 2018-11-13 NOTE — PROGRESS NOTES
Assessment/Plan   Diagnoses and all orders for this visit:    Aftercare following surgery  -     XR humerus right; Future    Continue OT/PT  Work on elbow flexion and triceps strength  Full weight-bearing as tolerated in PT  Profound triceps weakness  -EMG  -F/U after the EMG        Subjective   Patient ID: Estevan Manning is a 34 y o  female  Vitals:    18 1120   BP: 117/76   Pulse: 70     32yo female comes in for a follow up for her right elbow  She is 2 1/2 months s/p distal humerus ORIF by Dr Min Roblero  She missed 2 weeks of PT due to work comp coverage issues  She did get rescheduled with PT at [facility] and states she had her eval yesterday  She continues c/o posterior elbow numbness, swelling, olecranon tenderness with leaning on her arm, and triceps weakness  She feels her ROM is improving  No fevers or chills  She reports her current therapist in an OT who would like a referral for PT instead  She also states "I'm having issues" and requests a referral to a psychiatrist for this injury  She c/o anxiety with all of her responsibilities with this injury and at home  The following portions of the patient's history were reviewed and updated as appropriate: allergies, current medications, past family history, past medical history, past social history, past surgical history and problem list     Review of Systems  Ortho Exam  History reviewed  No pertinent past medical history    Past Surgical History:   Procedure Laterality Date    APPENDECTOMY       SECTION      CHOLECYSTECTOMY      ORIF HUMERUS FRACTURE Right 2018    Procedure: OPEN REDUCTION W/ INTERNAL FIXATION (ORIF) DISTAL HUMERUS;  Surgeon: Huan Garcia MD;  Location:  MAIN OR;  Service: Orthopedics    TONSILLECTOMY       Family History   Problem Relation Age of Onset    No Known Problems Mother     No Known Problems Father     No Known Problems Sister     No Known Problems Brother      Social History     Occupational History    Not on file  Social History Main Topics    Smoking status: Current Some Day Smoker    Smokeless tobacco: Never Used    Alcohol use No    Drug use: No    Sexual activity: Not on file       Review of Systems   Constitutional: Negative  HENT: Negative  Eyes: Negative  Respiratory: Negative  Cardiovascular: Negative  Gastrointestinal: Negative  Endocrine: Negative  Genitourinary: Negative  Musculoskeletal: As below      Allergic/Immunologic: Negative  Neurological: Negative  Hematological: Negative  Psychiatric/Behavioral: Negative  Objective   Physical Exam      I have personally reviewed pertinent films in PACS and my interpretation is hardware in place as expected       · Constitutional: Awake, Alert, Oriented  · Eyes: EOMI  · Psych: Mood and affect appropriate  · Heart: regular rate and rhythm  · Lungs: No audible wheezing  · Abdomen: soft  · Lymph: no lymphedema       right Elbow:  - Appearance   Swelling: posterior, no discoloration, no deformity, no ecchymosis and no erythema  - Palpation   + posterior elbow tenderness  - ROM   Extension: 15 and Flexion: 115, pronation 90, supination 90  Shoulder ROM: ER 90, IR 80   - Motor   limited by pain  - Special Tests   4+/5 wrist extension and 4/5 wrist flexion  2/5 tricep  Unable to extend elbow against gravity   - NVI distally      Dr Lupis Chakraborty evaluated this patient in person today

## 2018-11-13 NOTE — LETTER
November 13, 2018     Patient: Mei Ozuna   YOB: 1985   Date of Visit: 11/13/2018       To Whom it May Concern:    Mei Ozuna is under my professional care  She was seen in my office on 11/13/2018  She should remain out of work on until re-assessment at next follow up visit       If you have any questions or concerns, please don't hesitate to call           Sincerely,          Lenore Connor MD        CC: Mei Sulma

## 2018-11-21 ENCOUNTER — TELEPHONE (OUTPATIENT)
Dept: PSYCHIATRY | Facility: CLINIC | Age: 33
End: 2018-11-21

## 2018-11-21 NOTE — TELEPHONE ENCOUNTER
Behavorial Health Outpatient Intake Questions    Referred by: ortho    Check with provider before scheduling    Are there any developmental disabilities? No    Does the patient have hearing impairment? No    Does the patient have ICM or CTT? No    Taking injectable psychiatric medications? NoIf yes, patient can not be seen here  Has the patient ever seen or currently see a psychiatrist? No If yes who/when? Has the patient ever seen or currently see a therapist? No If yes who/when? How many visits did the pt have for previous psychiatric treatment?  History    Has the patient served in the [place] [address]? No    If yes, have you had combat services? No    Was the patient activated into federal active duty as a member of the national guard or reserve? No    Minor Child    Who has custody of the child? Is there a custody agreement? If there is a custody agreement remind parent that they must bring a copy to the first appt or they will not be seen  Behavorial Health Outpatient Intake History     Presenting Problem (in patient's words) PTSD,FEELS LIKE IS HAVING A NERVOUS BREAKDOWN,DREAMS,REPLAYS ACCIDENT,WAKES UP AT NIGHT,  8/28/18 ACCIDENT AT WORK,FREISAIAH FELL ON HER,OVER 5,000 LBS,FX ORIF HUMERUS FRACTURE AND OTHER INJURIES  IS SINGLE MOTHER,DIFFICULT TAKING CARE OF  CHILD,UNABLE TO USE ARM    Substance Abuse:No concerns of substance abuse are reported  Has the patient been seen here previously, either inpatient or outpatient? No outpatient    If seen as outpatient, what provider(s) did the patient see? N/A    A member of the patient's family has been in therapy here with NO    ACCEPTED as a patient Yes Appointment Date: 1/24/19 @ 10:00AM [address] [facility] [address]    Referred Elsewhere? No    Primary Care Physician: No primary care provider on file  [facility]    PCP telephone number: None    SUB: ELOISE  INS: WORK[employer]- [employer]  DATE OF INCIDENT: 8/28/18  CLAIM # [id number]  : Jigar Montesinos  [phone]  ADDRESS: [address] [facility] [id number] [phone]  FAX: [phone]

## 2019-02-26 ENCOUNTER — TELEPHONE (OUTPATIENT)
Dept: OBGYN CLINIC | Facility: HOSPITAL | Age: 34
End: 2019-02-26

## 2019-02-28 ENCOUNTER — HOSPITAL ENCOUNTER (EMERGENCY)
Facility: HOSPITAL | Age: 34
Discharge: HOME | End: 2019-02-28
Payer: COMMERCIAL

## 2019-02-28 VITALS
HEART RATE: 83 BPM | BODY MASS INDEX: 32.19 KG/M2 | DIASTOLIC BLOOD PRESSURE: 93 MMHG | WEIGHT: 218 LBS | TEMPERATURE: 97.3 F | SYSTOLIC BLOOD PRESSURE: 157 MMHG | RESPIRATION RATE: 18 BRPM | OXYGEN SATURATION: 100 %

## 2019-02-28 DIAGNOSIS — K04.7 DENTAL ABSCESS: ICD-10-CM

## 2019-02-28 DIAGNOSIS — K08.89 PAIN, DENTAL: Primary | ICD-10-CM

## 2019-02-28 PROCEDURE — 99282 EMERGENCY DEPT VISIT SF MDM: CPT

## 2019-02-28 RX ORDER — PENICILLIN V POTASSIUM 500 MG/1
500 TABLET ORAL 4 TIMES DAILY
Qty: 40 TABLET | Refills: 0 | Status: SHIPPED | OUTPATIENT
Start: 2019-02-28 | End: 2019-03-10

## 2019-02-28 RX ORDER — TRAMADOL HYDROCHLORIDE 50 MG/1
50 TABLET ORAL EVERY 6 HOURS PRN
Qty: 10 TABLET | Refills: 0 | Status: SHIPPED | OUTPATIENT
Start: 2019-02-28 | End: 2019-03-10

## 2019-02-28 NOTE — ED PROVIDER NOTES
History  Chief Complaint   Patient presents with    Dental Pain     pt c/o right upper toothache for "long time"     This is a 72-year-old female patient woke up with swollen right upper face and toothache  She has a history of a dental abscess in that area is been here before me given antibiotics  She does not have a dentist   She denies any systemic symptoms nothing makes it better or worse  She states she took naproxen which she takes for arm but only helped a little bit  This time she will placed on antibiotics be given something for pain and she will follow up with dental clinic for possible extraction  Prior to Admission Medications   Prescriptions Last Dose Informant Patient Reported? Taking?   ibuprofen (MOTRIN) 600 mg tablet   No No   Sig: Take 1 tablet (600 mg total) by mouth every 6 (six) hours as needed for mild pain (pain)      Facility-Administered Medications: None       History reviewed  No pertinent past medical history  Past Surgical History:   Procedure Laterality Date    APPENDECTOMY       SECTION      CHOLECYSTECTOMY      TONSILLECTOMY         History reviewed  No pertinent family history  I have reviewed and agree with the history as documented  Social History     Tobacco Use    Smoking status: Never Smoker    Smokeless tobacco: Never Used   Substance Use Topics    Alcohol use: Not Currently    Drug use: Not Currently        Review of Systems   All other systems reviewed and are negative  Physical Exam  Physical Exam   Constitutional: She appears well-developed and well-nourished  HENT:   Head: Normocephalic and atraumatic  Right Ear: External ear normal    Left Ear: External ear normal    Nose: Nose normal        Mouth/Throat: Oropharynx is clear and moist        Eyes: Pupils are equal, round, and reactive to light  Conjunctivae are normal    Neck: Normal range of motion  Neck supple  Cardiovascular: Normal rate and regular rhythm  Pulmonary/Chest: Effort normal and breath sounds normal    Abdominal: Soft  Bowel sounds are normal  There is no tenderness  Neurological: She is alert  Skin: Skin is warm  Psychiatric: She has a normal mood and affect  Her behavior is normal    Nursing note and vitals reviewed  Vital Signs  ED Triage Vitals [02/28/19 1031]   Temperature Pulse Respirations Blood Pressure SpO2   (!) 97 3 °F (36 3 °C) 83 18 157/93 100 %      Temp Source Heart Rate Source Patient Position - Orthostatic VS BP Location FiO2 (%)   Tympanic Monitor Sitting Right arm --      Pain Score       --           Vitals:    02/28/19 1031   BP: 157/93   Pulse: 83   Patient Position - Orthostatic VS: Sitting       Visual Acuity      ED Medications  Medications - No data to display    Diagnostic Studies  Results Reviewed     None                 No orders to display              Procedures  Procedures       Phone Contacts  ED Phone Contact    ED Course                               MDM    Disposition  Final diagnoses:   Pain, dental   Dental abscess     Time reflects when diagnosis was documented in both MDM as applicable and the Disposition within this note     Time User Action Codes Description Comment    2/28/2019 10:52 AM Mike Juarez Add [K08 89] Pain, dental     2/28/2019 10:52 AM Eusebia Stevenson Add [K04 7] Dental abscess       ED Disposition     ED Disposition Condition Date/Time Comment    Discharge Stable u Feb 28, 5244 66:40 AM Babita Lindsay discharge to home/self care              Follow-up Information     Follow up With Specialties Details Why [address]  Call today  [address]          Patient's Medications   Discharge Prescriptions    PENICILLIN V POTASSIUM (VEETID) 500 MG TABLET    Take 1 tablet (500 mg total) by mouth 4 (four) times a day for 10 days       Start Date: 2/28/2019 End Date: 3/10/2019       Order Dose: 500 mg Quantity: 40 tablet    Refills: 0    TRAMADOL (ULTRAM) 50 MG TABLET    Take 1 tablet (50 mg total) by mouth every 6 (six) hours as needed for moderate pain for up to 10 days       Start Date: 2/28/2019 End Date: 3/10/2019       Order Dose: 50 mg       Quantity: 10 tablet    Refills: 0     No discharge procedures on file      ED Provider  Electronically Signed by           Chery Scanlon PA-C  02/28/19 1053

## 2019-05-20 ENCOUNTER — HOSPITAL ENCOUNTER (OUTPATIENT)
Dept: RADIOLOGY | Facility: HOSPITAL | Age: 34
Discharge: HOME | End: 2019-05-20
Payer: OTHER MISCELLANEOUS

## 2019-05-20 ENCOUNTER — TRANSCRIBE ORDERS (OUTPATIENT)
Dept: ADMINISTRATIVE | Facility: HOSPITAL | Age: 34
End: 2019-05-20

## 2019-05-20 DIAGNOSIS — S54.01XD INJURY TO ULNAR NERVE OF RIGHT FOREARM, SUBSEQUENT ENCOUNTER: ICD-10-CM

## 2019-05-20 DIAGNOSIS — S54.01XD INJURY TO ULNAR NERVE OF RIGHT FOREARM, SUBSEQUENT ENCOUNTER: Primary | ICD-10-CM

## 2019-05-20 PROCEDURE — 73060 X-RAY EXAM OF HUMERUS: CPT

## 2019-10-29 ENCOUNTER — HOSPITAL ENCOUNTER (OUTPATIENT)
Dept: RADIOLOGY | Facility: HOSPITAL | Age: 34
Discharge: HOME | End: 2019-10-29
Payer: OTHER MISCELLANEOUS

## 2019-10-29 ENCOUNTER — TRANSCRIBE ORDERS (OUTPATIENT)
Dept: ADMINISTRATIVE | Facility: HOSPITAL | Age: 34
End: 2019-10-29

## 2019-10-29 DIAGNOSIS — S46.391D OTHER INJURY OF MUSCLE, FASCIA AND TENDON OF TRICEPS, RIGHT ARM, SUBSEQUENT ENCOUNTER: Primary | ICD-10-CM

## 2019-10-29 DIAGNOSIS — G56.21 ULNAR NERVE ENTRAPMENT AT RIGHT ELBOW: ICD-10-CM

## 2019-10-29 DIAGNOSIS — S42.331A CLOSED DISPLACED OBLIQUE FRACTURE OF SHAFT OF RIGHT HUMERUS, INITIAL ENCOUNTER: ICD-10-CM

## 2019-10-29 DIAGNOSIS — S46.391D OTHER INJURY OF MUSCLE, FASCIA AND TENDON OF TRICEPS, RIGHT ARM, SUBSEQUENT ENCOUNTER: ICD-10-CM

## 2019-10-29 PROCEDURE — 73060 X-RAY EXAM OF HUMERUS: CPT

## 2019-11-21 ENCOUNTER — TRANSCRIBE ORDERS (OUTPATIENT)
Dept: ADMINISTRATIVE | Facility: HOSPITAL | Age: 34
End: 2019-11-21

## 2019-11-21 DIAGNOSIS — S42.331A CLOSED DISPLACED OBLIQUE FRACTURE OF SHAFT OF RIGHT HUMERUS, INITIAL ENCOUNTER: Primary | ICD-10-CM

## 2019-11-21 DIAGNOSIS — S46.391D OTHER INJURY OF MUSCLE, FASCIA AND TENDON OF TRICEPS, RIGHT ARM, SUBSEQUENT ENCOUNTER: ICD-10-CM

## 2019-12-18 ENCOUNTER — TELEPHONE (OUTPATIENT)
Dept: OBGYN CLINIC | Facility: HOSPITAL | Age: 34
End: 2019-12-18

## 2019-12-18 NOTE — TELEPHONE ENCOUNTER
Patient called in  # [id number]    Patient is requesting a clearance card because of the metal she has from surgery perform in 2018  She said that every time she goes through metal detector it is going off and she was told there is some kind of clearance card she can get to show when she needs it  Please advise

## 2023-03-09 NOTE — PROGRESS NOTES
Assessment/Plan:  1  Fracture follow-up  XR humerus right    Elbow Rom Brace       Scribe Attestation    I,:   Mel Mandujano MA am acting as a scribe while in the presence of the attending physician :        I,:   Arianne Morgan MD personally performed the services described in this documentation    as scribed in my presence :              I discussed with Babita today that she is doing well post operatively  Her incision is healing well with no signs of infection  Her staples were removed today without any complications  She will be placed into a right hinge elbow brace locked at 90 degrees  She will start physical therapy for low back and core strengthening and elbow and shoulder AAROM  She will follow up in 4 weeks for repeat evaluation and repeat x-ray  Subjective:   Solis Mata is a 34 y o  female who presents to the office today 2 weeks s/p right distal humerus ORIF performed on 18  She states a burning sensation into her right elbow  She also notes some numbness into her fingers  She states the pain does wake her up from sleep at night  Review of Systems   Constitutional: Negative for chills and fever  HENT: Negative for drooling and sneezing  Eyes: Negative for redness  Respiratory: Negative for cough and wheezing  Gastrointestinal: Negative for nausea and vomiting  Musculoskeletal: Negative for arthralgias, joint swelling and myalgias  Neurological: Negative for weakness and numbness  Psychiatric/Behavioral: Negative for behavioral problems  The patient is not nervous/anxious  History reviewed  No pertinent past medical history      Past Surgical History:   Procedure Laterality Date    APPENDECTOMY       SECTION      CHOLECYSTECTOMY      ORIF HUMERUS FRACTURE Right 2018    Procedure: OPEN REDUCTION W/ INTERNAL FIXATION (ORIF) DISTAL HUMERUS;  Surgeon: Arianne Morgan MD;  Location: [facility];  Service: Orthopedics    TONSILLECTOMY Family History   Problem Relation Age of Onset    No Known Problems Mother     No Known Problems Father     No Known Problems Sister     No Known Problems Brother        Social History     Occupational History    Not on file  Social History Main Topics    Smoking status: Current Some Day Smoker    Smokeless tobacco: Never Used    Alcohol use No    Drug use: No    Sexual activity: Not on file         Current Outpatient Prescriptions:     methocarbamol (ROBAXIN) 750 mg tablet, Take 1 tablet (750 mg total) by mouth every 6 (six) hours as needed for muscle spasms, Disp: 20 tablet, Rfl: 0    oxyCODONE (ROXICODONE) 5 mg immediate release tablet, Take 1 tablet (5 mg total) by mouth every 4 (four) hours as needed for moderate pain Take 1-2 tablets PO q 4 hours PRN Pain Max Daily Amount: 30 mg (Patient not taking: Reported on 9/11/2018 ), Disp: 30 tablet, Rfl: 0    No Known Allergies    Objective:  Vitals:    09/11/18 1624   BP: 127/81   Pulse: 80       Ortho Exam     RUE    Incision healing, without signs of infection  Staples removed in the office today and steri-strips applied  Able to passively move all digits  Full wrist flexion and extension     Physical Exam   Constitutional: She is oriented to person, place, and time  She appears well-developed and well-nourished  HENT:   Head: Atraumatic  Eyes: Conjunctivae are normal    Neck: Normal range of motion  Cardiovascular: Normal rate  Pulmonary/Chest: Effort normal    Musculoskeletal:   As noted in HPI   Neurological: She is alert and oriented to person, place, and time  Skin: Skin is warm and dry  Psychiatric: She has a normal mood and affect  Her behavior is normal  Judgment and thought content normal        I have personally reviewed pertinent films in PACS and my interpretation is as follows:X-ray right humerus performed on 9/11/18 shows well aligned orthopedic hardware   X-rays of lumbar spine performed on 09/11/18 are normal  Tremfya Pregnancy And Lactation Text: The risk during pregnancy and breastfeeding is uncertain with this medication.

## 2023-12-01 NOTE — H&P
H&P Exam - Trauma   Shreve Sh[place] And Thirty[place]Six 82 y o  female MRN: [id number]  Unit/Bed#:  Encounter: [id number]    Assessment/Plan   Trauma Alert: Level B  Model of Arrival: Ambulance  Trauma Team: Attending Roger Kruger, Residents Eliud George and VANDANA 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  She was entrapped underneath the crate and her worker crew removed her from the position  She intially lost consciousness  Quickly regained consciousness and was GCS 15  She was complaining of right upper arm pain and pain over her back  She was on no home medications  Review of Systems   Constitutional: Negative for chills, diaphoresis and fever  HENT: Negative for congestion, sinus pressure, sore throat and trouble swallowing  Eyes: Negative for pain, discharge and itching  Respiratory: Negative for cough, chest tightness, shortness of breath and wheezing  Cardiovascular: Negative for chest pain, palpitations and leg swelling  Gastrointestinal: Negative for abdominal distention, abdominal pain, blood in stool, diarrhea, nausea and vomiting  Endocrine: Negative for polyphagia and polyuria  Genitourinary: Negative for difficulty urinating, dysuria, flank pain, hematuria, pelvic pain and vaginal bleeding  Musculoskeletal: Positive for arthralgias (R elbow pain)  Negative for back pain, neck pain and neck stiffness  Skin: Negative for color change and rash     Neurological: Negative for dizziness, syncope, weakness, light-headedness and headaches  Historical Information     No past medical history on file  No past surgical history on file  Social History   History   Alcohol use Not on file     History   Drug use: Unknown     History   Smoking Status    Not on file   Smokeless Tobacco    Not on file       There is no immunization history on file for this patient  Last Tetanus:   Family History: Non-contributory      Meds/Allergies   all current active meds have been reviewed    Allergies not on file      PHYSICAL EXAM    Objective   Vitals:   First set:      Primary Survey:   (A) Airway: intact  (B) Breathing: CTA b/l, symmetric chest rise  (C) Circulation: Pulses:   normal  (D) Disabliity:  GCS Total:  15  (E) Expose:  Completed    Secondary Survey: (Click on Physical Exam tab above)  Physical Exam   Constitutional: She is oriented to person, place, and time  She appears well-developed and well-nourished  She appears distressed  HENT:   Head: Normocephalic and atraumatic  Right Ear: External ear normal    Left Ear: External ear normal    Nose: Nose normal    Mouth/Throat: No oropharyngeal exudate  Eyes: Conjunctivae and EOM are normal  Pupils are equal, round, and reactive to light  Right eye exhibits no discharge  Left eye exhibits no discharge  Neck: Normal range of motion  Neck supple  C collar placed  No C spine tenderness     Cardiovascular: Normal rate, regular rhythm, normal heart sounds and intact distal pulses  No murmur heard  2+ radial pulse   Pulmonary/Chest: Effort normal and breath sounds normal  No respiratory distress  She has no wheezes  She has no rales  Abdominal: Soft  She exhibits no distension  There is no tenderness  There is no rebound  Musculoskeletal: Normal range of motion  She exhibits tenderness (R elbow)  She exhibits no edema or deformity     Pelvis stable  Abrasion to R shoulder, R posterior back and R gluteus    Lymphadenopathy:     She has no cervical adenopathy  Neurological: She is alert and oriented to person, place, and time  She displays normal reflexes  No cranial nerve deficit  She exhibits normal muscle tone  Sensation grossly intact     Skin: Skin is warm and dry  No rash noted  Invasive Devices          No matching active lines, drains, or airways          Lab Results:   BMP/CMP:   Lab Results   Component Value Date    GLUCOSE 126 08/28/2018   , CBC:   Lab Results   Component Value Date    WBC 17 33 (H) 08/28/2018    HGB 12 2 08/28/2018    HCT 37 7 08/28/2018    MCV 94 08/28/2018     08/28/2018    MCH 30 4 08/28/2018    MCHC 32 4 08/28/2018    RDW 12 9 08/28/2018    MPV 10 1 08/28/2018    NRBC 0 08/28/2018   , CK:   Lab Results   Component Value Date    CKTOTAL 79 08/28/2018   , Pregnancy: No results found for: PREGTESTUR and ISTAT: No components found for: VBG  Imaging/EKG Studies: Results: I have personally reviewed pertinent reports    , FAST: indeterminate 2/2 patient positioning  Other Studies:     Code Status: No Order  Advance Directive and Living Will:      Power of :    POLST: No

## (undated) DEVICE — INTENDED FOR TISSUE SEPARATION, AND OTHER PROCEDURES THAT REQUIRE A SHARP SURGICAL BLADE TO PUNCTURE OR CUT.: Brand: BARD-PARKER SAFETY BLADES SIZE 10, STERILE

## (undated) DEVICE — 2.5MM DRILL BIT/QC/GOLD/110MM

## (undated) DEVICE — PACK MAJOR ORTHO W/SPLITS PBDS

## (undated) DEVICE — REM POLYHESIVE ADULT PATIENT RETURN ELECTRODE: Brand: VALLEYLAB

## (undated) DEVICE — 2.8MM DRILL BIT/QC/165MM

## (undated) DEVICE — PADDING CAST 4 IN  COTTON STRL

## (undated) DEVICE — CURITY NON-ADHERENT STRIPS: Brand: CURITY

## (undated) DEVICE — SUT FIBERWIRE #2 1/2 CIRCLE T-5 38IN AR-7200

## (undated) DEVICE — GLOVE SRG BIOGEL ORTHOPEDIC 8.5

## (undated) DEVICE — SUT ETHILON 2-0 FSLX 30 IN 1674H

## (undated) DEVICE — GAUZE SPONGES,16 PLY: Brand: CURITY

## (undated) DEVICE — ACE WRAP 6 IN UNSTERILE

## (undated) DEVICE — SUT VICRYL PLUS 0 CTB-1 27 IN VCPB260H

## (undated) DEVICE — IMPERVIOUS STOCKINETTE: Brand: DEROYAL

## (undated) DEVICE — GLOVE INDICATOR PI UNDERGLOVE SZ 8.5 BLUE

## (undated) DEVICE — KERLIX BANDAGE ROLL: Brand: KERLIX

## (undated) DEVICE — DRAPE C-ARM X-RAY

## (undated) DEVICE — SUT VICRYL PLUS 2-0 CTB-1 27 IN VCPB259H

## (undated) DEVICE — INTENDED FOR TISSUE SEPARATION, AND OTHER PROCEDURES THAT REQUIRE A SHARP SURGICAL BLADE TO PUNCTURE OR CUT.: Brand: BARD-PARKER SAFETY BLADES SIZE 15, STERILE

## (undated) DEVICE — ALL PURPOSE SPONGES,NONWOVEN, 4 PLY: Brand: CURITY

## (undated) DEVICE — PLUMEPEN PRO 10FT

## (undated) DEVICE — CHLORAPREP HI-LITE 26ML ORANGE

## (undated) DEVICE — SILVER-COATED ANTIMICROBIAL BARRIER DRESSING: Brand: ACTICOAT   4" X 8"

## (undated) DEVICE — ACE WRAP 4 IN UNSTERILE

## (undated) DEVICE — PAD CAST 4 IN COTTON NON STERILE

## (undated) DEVICE — ABDOMINAL PAD: Brand: DERMACEA

## (undated) DEVICE — SPONGE PVP SCRUB WING STERILE

## (undated) DEVICE — ARM SLING: Brand: DEROYAL